# Patient Record
Sex: FEMALE | Race: WHITE | NOT HISPANIC OR LATINO | Employment: FULL TIME | URBAN - METROPOLITAN AREA
[De-identification: names, ages, dates, MRNs, and addresses within clinical notes are randomized per-mention and may not be internally consistent; named-entity substitution may affect disease eponyms.]

---

## 2017-10-03 ENCOUNTER — ALLSCRIPTS OFFICE VISIT (OUTPATIENT)
Dept: OTHER | Facility: OTHER | Age: 47
End: 2017-10-03

## 2017-10-03 ENCOUNTER — GENERIC CONVERSION - ENCOUNTER (OUTPATIENT)
Dept: OTHER | Facility: OTHER | Age: 47
End: 2017-10-03

## 2017-10-03 ENCOUNTER — LAB REQUISITION (OUTPATIENT)
Dept: LAB | Facility: HOSPITAL | Age: 47
End: 2017-10-03
Payer: COMMERCIAL

## 2017-10-03 DIAGNOSIS — Z12.39 ENCOUNTER FOR OTHER SCREENING FOR MALIGNANT NEOPLASM OF BREAST: ICD-10-CM

## 2017-10-03 DIAGNOSIS — Z01.419 ENCOUNTER FOR GYNECOLOGICAL EXAMINATION WITHOUT ABNORMAL FINDING: ICD-10-CM

## 2017-10-03 PROCEDURE — G0145 SCR C/V CYTO,THINLAYER,RESCR: HCPCS | Performed by: OBSTETRICS & GYNECOLOGY

## 2017-10-03 PROCEDURE — 87624 HPV HI-RISK TYP POOLED RSLT: CPT | Performed by: OBSTETRICS & GYNECOLOGY

## 2017-10-09 LAB — HPV RRNA GENITAL QL NAA+PROBE: ABNORMAL

## 2017-10-20 ENCOUNTER — GENERIC CONVERSION - ENCOUNTER (OUTPATIENT)
Dept: OTHER | Facility: OTHER | Age: 47
End: 2017-10-20

## 2017-10-20 LAB
LAB AP GYN PRIMARY INTERPRETATION: NORMAL
Lab: NORMAL
PATH INTERP SPEC-IMP: NORMAL

## 2017-11-02 ENCOUNTER — GENERIC CONVERSION - ENCOUNTER (OUTPATIENT)
Dept: OTHER | Facility: OTHER | Age: 47
End: 2017-11-02

## 2017-11-05 DIAGNOSIS — R92.8 OTHER ABNORMAL AND INCONCLUSIVE FINDINGS ON DIAGNOSTIC IMAGING OF BREAST: ICD-10-CM

## 2017-11-08 ENCOUNTER — ALLSCRIPTS OFFICE VISIT (OUTPATIENT)
Dept: OTHER | Facility: OTHER | Age: 47
End: 2017-11-08

## 2017-11-08 PROCEDURE — 88305 TISSUE EXAM BY PATHOLOGIST: CPT | Performed by: OBSTETRICS & GYNECOLOGY

## 2017-11-10 ENCOUNTER — LAB REQUISITION (OUTPATIENT)
Dept: LAB | Facility: HOSPITAL | Age: 47
End: 2017-11-10
Payer: COMMERCIAL

## 2017-11-10 DIAGNOSIS — R87.613 HIGH GRADE SQUAMOUS INTRAEPITHELIAL LESION ON CYTOLOGIC SMEAR OF CERVIX (HGSIL): ICD-10-CM

## 2017-11-10 NOTE — PROCEDURES
Assessment  1  Pap smear abnormality of cervix with HGSIL (795 04) (R87 613)   2  Pap smear of cervix shows high risk HPV present (795 05) (R87 810)    Plan  Abnormality of both breasts on screening mammography    · *US BREAST LEFT LIMITED (DIAGNOSTIC); Status:Active; Requested WEE:[id number];    Perform:[facility]; XXR:[id number]; Last Updated By:Karen Nelson; 11/6/2017 8:17:43 AM;Ordered; For:Abnormality of both breasts on screening mammography; Ordered By:Titi Almonte;   · *US BREAST RIGHT LIMITED (DIAGNOSTIC); Status:Active; Requested QCB:[id number];    Perform:[facility]; LUX:[id number]; Last Updated By:Karen Nelson; 11/6/2017 8:17:51 AM;Ordered; For:Abnormality of both breasts on screening mammography; Ordered By:Titi Almonte;  Encounter for other screening for malignant neoplasm of breast    · MAMMO SCREENING BILATERAL W 3D & CAD; Status:Canceled - Scheduling;    Perform:[facility]; [phone]; Last Updated By:Benito Izaguirre; 11/7/2017 2:08:43 PM;Ordered;for other screening for malignant neoplasm of breast; Ordered By:Titi Almonte;  Pap smear of cervix shows high risk HPV present    · Follow Up After Tests Complete Evaluation and Treatment  Follow-up  Status: Hold For -Scheduling  Requested for: [id number]   Ordered;Pap smear of cervix shows high risk HPV present; Ordered By: Maxx Gtz Performed:  Due: [id number]    Discussion/Summary  Discussion Summary:   HGIL, non 16/18 HR HPVCIN 2, 2 small polyps seen/removedpending  Chief Complaint  pt here for colposcopy   Active Problems  1  Abnormality of both breasts on screening mammography (793 80) (R92 8)   2  Encounter for other screening for malignant neoplasm of breast (V76 19) (Z12 39)   3  Hypothyroidism (244 9) (E03 9)   4  Visit for routine gyn exam (V72 31) (Z01 419)    Current Meds    1  Lo Loestrin Fe 1 MG-10 MCG / 10 MCG Oral Tablet; take 1 tablet daily by mouth;  Therapy: [id number] to (Doren De La Fuente) Requested for: 62Kwb1850; Last Rx:[id number] Ordered    2  Synthroid TABS; Therapy: (Recorded:95Ick0678) to Recorded    Allergies    1  No Known Drug Allergies    Procedure    Procedure: Indication: high grade squamous intraepithelial lesion-- and-- PCR positive for high risk HPV  Risks, benefits and alternatives were discussed with the patient  We discussed possible complications, including infection,-- bleeding-- and-- allergic reaction  Written consent was obtained prior to the procedure  The patient was premedicated with none  Procedure Note:  The squamocolumnar junction was fully visualized  Observation without staining showed no abnormalities, but-- no atypical vessels  After bathing the cervix in acetic acid, evaluation showed acetowhite changes at 7, 11 o'clock, but-- no punctation,-- no mosaicism-- and-- no atypical vessels  Vaginal Vault: no abnormalities seen  Vulva: no abnormalities seen  Cervical Biopsy: 2 biopsies taken of the cervix  -- the biopsies were taken at 7, 11 o'clock  Endocervical curettage was performed  Hemostasis was obtained with Monsel's solution  Patient Status: the patient tolerated the procedure well  Complications: there were no complications       SEE COLPO MAP         Signatures   Electronically signed by : LION Mireles ; Nov 8 2017  3:13PM EST                       (Author)

## 2017-11-17 ENCOUNTER — GENERIC CONVERSION - ENCOUNTER (OUTPATIENT)
Dept: OTHER | Facility: OTHER | Age: 47
End: 2017-11-17

## 2017-11-22 ENCOUNTER — GENERIC CONVERSION - ENCOUNTER (OUTPATIENT)
Dept: OTHER | Facility: OTHER | Age: 47
End: 2017-11-22

## 2017-11-29 ENCOUNTER — GENERIC CONVERSION - ENCOUNTER (OUTPATIENT)
Dept: OTHER | Facility: OTHER | Age: 47
End: 2017-11-29

## 2018-01-12 NOTE — RESULT NOTES
Verified Results  (1) THIN PREP PAP WITH IMAGING [id number] 09:47AM Pieter Saldaña   TW Order Number: [id number]     Test Name Result Flag Reference   LAB AP CASE REPORT (Report)     Gynecologic Cytology Report            Case: [id number]                  Authorizing Provider: Elyssa Orta MD     Collected:      10/03/2017 0947        First Screen:     Mariannlisa Gonzales, CT Received:      10/06/2017 7574        Pathologist:      Fredo Correa DO                               Specimen:  LIQUID-BASED PAP, SCREENING, Cervix   HPV HIGH RISK RESULT (Report)     HPV, High Risk: HPV POS, HPV16 NEG, HPV18 NEG      Other High Risk HPV Positive, HPV 16 Negative, HPV 18 Negative  Specimen is positive for the DNA of any one of, or combination of the following high risk HPV types: 88,29,61,77,80,46,93,40,64,02,37,93  HPV types 16 and 18 DNA were undetectable or below the pre-set threshold  [employer] FDA approved Bautista 4800 is utilized with strict adherence to the manufacturers instruction  manual to test for the presence of High-Risk HPV DNA, as well as HPV 16 and HPV 18  This instrument  has been validated by our laboratory and/or by the   A negative result does not preclude the presence of HPV infection because results depend on adequate  specimen collection, absence of inhibitors and sufficient DNA to be detected  Additionally, HPV negative  results are not intended to prevent women from proceeding to colposcopy if clinically warranted  Positive HPV test results indicate the presence of any one or more of the high risk types, but since patients  are often co-infected with low-risk types it does not rule out the presence of low-risk types in patients  with mixed infections     LAB AP GYN PRIMARY INTERPRETATION      Epithelial cell abnormality  Electronically signed by Fredo Correa DO on 10/20/2017 at 8:25 AM   LAB AP GYN INTERPRETATION      High grade squamous intraepithelial lesion   LAB AP GYN SPECIMEN ADEQUACY      Satisfactory for evaluation  Endocervical/transformation zone component present  LAB AP GYN ADDITIONAL INFORMATION (Report)     [employer] FDA approved ,  and ThinPrep Imaging System are   utilized with strict adherence to the 's instruction manual to   prepare gynecologic and non-gynecologic cytology specimens for the   production of ThinPrep slides as well as for gynecologic ThinPrep imaging  These processes have been validated by our laboratory and/or by the     The Pap test is not a diagnostic procedure and should not be used as the   sole means to detect cervical cancer  It is only a screening procedure to   aid in the detection of cervical cancer and its precursors  Both   false-negative and false-positive results have been experienced  Your   patient's test result should be interpreted in this context together with   the history and clinical findings  LAB AP CASE REPORT (Report)     Gynecologic Cytology Report            Case: [id number]                  Authorizing Provider: Ena Weems MD     Collected:      10/03/2017 0947        First Screen:     ROMARIO Fox Received:      10/06/2017 1702        Pathologist:      Mike Garcia DO                               Specimen:  LIQUID-BASED PAP, SCREENING, Cervix   HPV HIGH RISK RESULT (Report)     HPV, High Risk: HPV POS, HPV16 NEG, HPV18 NEG      Other High Risk HPV Positive, HPV 16 Negative, HPV 18 Negative  Specimen is positive for the DNA of any one of, or combination of the following high risk HPV types: 89,12,55,08,25,87,47,08,21,74,84,64  HPV types 16 and 18 DNA were undetectable or below the pre-set threshold  [employer] FDA approved Bautista 4800 is utilized with strict adherence to the manufacturers instruction  manual to test for the presence of High-Risk HPV DNA, as well as HPV 16 and HPV 18   This instrument  has been validated by our laboratory and/or by the   A negative result does not preclude the presence of HPV infection because results depend on adequate  specimen collection, absence of inhibitors and sufficient DNA to be detected  Additionally, HPV negative  results are not intended to prevent women from proceeding to colposcopy if clinically warranted  Positive HPV test results indicate the presence of any one or more of the high risk types, but since patients  are often co-infected with low-risk types it does not rule out the presence of low-risk types in patients  with mixed infections  LAB AP GYN PRIMARY INTERPRETATION      Epithelial cell abnormality  Electronically signed by Meena Zamora DO on 10/20/2017 at 8:25 AM   LAB AP GYN INTERPRETATION      High grade squamous intraepithelial lesion   LAB AP GYN SPECIMEN ADEQUACY      Satisfactory for evaluation  Endocervical/transformation zone component present  LAB AP GYN ADDITIONAL INFORMATION (Report)     [employer] FDA approved ,  and ThinPrep Imaging System are   utilized with strict adherence to the 's instruction manual to   prepare gynecologic and non-gynecologic cytology specimens for the   production of ThinPrep slides as well as for gynecologic ThinPrep imaging  These processes have been validated by our laboratory and/or by the     The Pap test is not a diagnostic procedure and should not be used as the   sole means to detect cervical cancer  It is only a screening procedure to   aid in the detection of cervical cancer and its precursors  Both   false-negative and false-positive results have been experienced  Your   patient's test result should be interpreted in this context together with   the history and clinical findings

## 2018-01-13 NOTE — RESULT NOTES
Verified Results  (1) TISSUE EXAM [id number] 10:07AM Tawana Argueta     Test Name Result Flag Reference   LAB AP CASE REPORT (Report)     Surgical Pathology Report             Case: [id number]                   Authorizing Provider: Sheryle Bernard, MD     Collected:      11/08/2017           Pathologist:      Erik Armstrong MD   Received:      11/10/2017 1143        Specimens:  A) - Polyp, Cervical/Endometrial, Cervical polyp                            B) - Cervix, CAL 1          C) - Cervix, CAL 2          D) - Endocervical   LAB AP FINAL DIAGNOSIS (Report)     A  Cervical polyp:    - Features consistent with benign endocervical polyp     - Negative for dysplasia and malignancy  B  Cervix, 8:00, biopsy:    - Low-grade squamous intraepithelial lesion (LGSIL)/mild squamous   dysplasia (CAL 1) with HPV cytopathic      effect     - Negative for invasive carcinoma  C  Cervix, 10:00, biopsy:    - Low-grade squamous intraepithelial lesion (LGSIL)/mild squamous   dysplasia (CAL 1) with HPV cytopathic      effect     - Negative for invasive carcinoma  D  Endocervical curettings:    - Benign endocervical glandular epithelium, negative for dysplasia and   malignancy  - Scant benign endometrial tissue  Electronically signed by Erik Armstrong MD on 11/15/2017 at 10:07 AM   LAB AP NOTE      Interpretation performed at [facility], [address] [address]   LAB AP SURGICAL ADDITIONAL INFORMATION (Report)     All controls performed with the immunohistochemical stains reported above   reacted appropriately  These tests were developed and their performance   characteristics determined by [employer] or   [employer]  They may not be cleared or approved by the [place]  Food and Drug Administration  The FDA has determined that such clearance   or approval is not necessary  These tests are used for clinical purposes     They should not be regarded as investigational or for research  This   laboratory has been approved by [place], designated as a high-complexity   laboratory and is qualified to perform these tests  LAB AP GROSS DESCRIPTION (Report)     A  The specimen is received in formalin, labeled with the patient's name   and hospital number, and is designated Cervical polyp  The specimen   consists of multiple tan brown soft tissue, hemorrhagic and mucoid   fragments measuring in aggregate 0 7 x 0 6 x 0 1 cm  Entirely submitted  One cassette  B  The specimen is received in formalin, labeled with the patient's name   and hospital number, and is designated Cervical biopsy at 8:00  The   specimen consists of a tan soft tissue fragment measuring 0 6 cm  Entirely   submitted  One cassette  C  The specimen is received in formalin, labeled with the patient's name   and hospital number, and is designated Cervical biopsy at 10:00  The   specimen consists of multiple tan to brown to colorless mucinous,   hemorrhagic and soft tissue fragments measuring in aggregate 0 7 x 0 6 x   0 1 cm  Entirely submitted  One cassette  D  The specimen is received in formalin, labeled with the patient's name   and hospital number, and is designated Endocervical curetting  The   specimen consists of multiple brown to colorless mucinous and hemorrhagic   fragments measuring in aggregate 2 x 1 2 x 0 1 cm  Entirely submitted  One   cassette  Note: The estimated total formalin fixation time based upon information   provided by the submitting clinician and the standard processing schedule   is less than 72 hours      [facility]

## 2018-01-14 VITALS
OXYGEN SATURATION: 99 % | WEIGHT: 155 LBS | SYSTOLIC BLOOD PRESSURE: 126 MMHG | BODY MASS INDEX: 26.46 KG/M2 | HEART RATE: 73 BPM | HEIGHT: 64 IN | DIASTOLIC BLOOD PRESSURE: 78 MMHG

## 2018-01-22 VITALS
WEIGHT: 156.13 LBS | DIASTOLIC BLOOD PRESSURE: 62 MMHG | HEART RATE: 76 BPM | OXYGEN SATURATION: 99 % | HEIGHT: 64 IN | BODY MASS INDEX: 26.66 KG/M2 | SYSTOLIC BLOOD PRESSURE: 112 MMHG

## 2018-01-22 VITALS
OXYGEN SATURATION: 99 % | WEIGHT: 154.13 LBS | SYSTOLIC BLOOD PRESSURE: 124 MMHG | BODY MASS INDEX: 26.31 KG/M2 | HEART RATE: 75 BPM | HEIGHT: 64 IN | DIASTOLIC BLOOD PRESSURE: 76 MMHG

## 2018-10-31 PROBLEM — Z12.4 SCREENING FOR CERVICAL CANCER: Status: ACTIVE | Noted: 2018-10-31

## 2018-10-31 PROBLEM — Z12.31 ENCOUNTER FOR SCREENING MAMMOGRAM FOR BREAST CANCER: Status: ACTIVE | Noted: 2018-10-31

## 2018-10-31 PROBLEM — Z01.419 ENCOUNTER FOR ANNUAL ROUTINE GYNECOLOGICAL EXAMINATION: Status: ACTIVE | Noted: 2018-10-31

## 2018-10-31 PROBLEM — B97.7 HPV IN FEMALE: Status: ACTIVE | Noted: 2018-10-31
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
## 2018-11-01 ENCOUNTER — ANNUAL EXAM (OUTPATIENT)
Dept: GYNECOLOGY | Facility: CLINIC | Age: 48
End: 2018-11-01
Payer: COMMERCIAL

## 2018-11-01 VITALS
BODY MASS INDEX: 27.55 KG/M2 | HEIGHT: 64 IN | DIASTOLIC BLOOD PRESSURE: 66 MMHG | WEIGHT: 161.4 LBS | SYSTOLIC BLOOD PRESSURE: 110 MMHG

## 2018-11-01 DIAGNOSIS — Z87.410 HISTORY OF CERVICAL DYSPLASIA: ICD-10-CM

## 2018-11-01 DIAGNOSIS — Z12.4 SCREENING FOR CERVICAL CANCER: ICD-10-CM

## 2018-11-01 DIAGNOSIS — Z01.419 ENCOUNTER FOR ANNUAL ROUTINE GYNECOLOGICAL EXAMINATION: Primary | ICD-10-CM

## 2018-11-01 DIAGNOSIS — B97.7 HPV IN FEMALE: ICD-10-CM

## 2018-11-01 DIAGNOSIS — Z12.31 ENCOUNTER FOR SCREENING MAMMOGRAM FOR BREAST CANCER: ICD-10-CM

## 2018-11-01 PROCEDURE — S0612 ANNUAL GYNECOLOGICAL EXAMINA: HCPCS | Performed by: OBSTETRICS & GYNECOLOGY

## 2018-11-01 PROCEDURE — G0124 SCREEN C/V THIN LAYER BY MD: HCPCS | Performed by: PATHOLOGY

## 2018-11-01 PROCEDURE — 87624 HPV HI-RISK TYP POOLED RSLT: CPT | Performed by: OBSTETRICS & GYNECOLOGY

## 2018-11-01 PROCEDURE — G0145 SCR C/V CYTO,THINLAYER,RESCR: HCPCS | Performed by: PATHOLOGY

## 2018-11-01 RX ORDER — LEVOTHYROXINE SODIUM 0.05 MG/1
50 TABLET ORAL DAILY
COMMUNITY

## 2018-11-06 LAB
HPV HR 12 DNA CVX QL NAA+PROBE: POSITIVE
HPV16 DNA CVX QL NAA+PROBE: NEGATIVE
HPV18 DNA CVX QL NAA+PROBE: NEGATIVE

## 2018-11-08 LAB
LAB AP GYN PRIMARY INTERPRETATION: NORMAL
Lab: NORMAL
PATH INTERP SPEC-IMP: NORMAL

## 2018-11-09 NOTE — PROGRESS NOTES
Persistently abnormal Pap smear  Colposcopy performed last year and CAL 1 was present  This has not resolved  Colposcopy is indicated again for a possible progression of dysplasia    HPV high risk non 16/18 is positive

## 2018-11-25 PROBLEM — R87.613 PAP SMEAR ABNORMALITY OF CERVIX WITH HGSIL: Status: ACTIVE | Noted: 2018-11-25

## 2018-12-05 RX ORDER — FLUTICASONE PROPIONATE 50 MCG
SPRAY, SUSPENSION (ML) NASAL
COMMUNITY

## 2018-12-05 RX ORDER — LORATADINE 10 MG/1
TABLET ORAL DAILY
COMMUNITY

## 2018-12-06 PROBLEM — R87.618 PAP SMEAR ABNORMALITY OF CERVIX/HUMAN PAPILLOMAVIRUS (HPV) POSITIVE: Status: ACTIVE | Noted: 2018-12-06

## 2018-12-06 NOTE — PROGRESS NOTES
Assessment/Plan:   Colposcopy for persistent abnormal Pap smear suggesting HGIL  CAL 1 found on colposcopy '17  Persistent HPV non 16/18  Lesions similar to last year  Diagnoses and all orders for this visit:    Pap smear abnormality of cervix with HGSIL    History of cervical dysplasia    Pap smear abnormality of cervix/human papillomavirus (HPV) positive    Other orders  -     loratadine (CLARITIN) 10 mg tablet; Daily  -     fluticasone (FLONASE) 50 mcg/act nasal spray; fluticasone 50 mcg/actuation nasal spray,suspension  -     Norethin-Eth Estrad-Fe Biphas (LO LOESTRIN FE PO); Lo Loestrin Fe 1 mg-10 mcg (24)/10 mcg (2) tablet  -     Tissue Exam; Future              Subjective:        Patient ID: Brinda Brooks is a 46 y o  female  Rosio Scarce is here for a repeat colposcopy for persistently abnormal Pap smear  Last year on colposcopy she had CAL 1 which was observed        The following portions of the patient's history were reviewed and updated as appropriate: She  has no past medical history on file  Patient Active Problem List    Diagnosis Date Noted    Pap smear abnormality of cervix/human papillomavirus (HPV) positive 12/06/2018    Pap smear abnormality of cervix with HGSIL 11/25/2018    History of cervical dysplasia 11/01/2018    Encounter for annual routine gynecological examination 10/31/2018    Screening for cervical cancer 10/31/2018    Encounter for screening mammogram for breast cancer 10/31/2018    HPV in female 10/31/2018     She  has a past surgical history that includes Knee surgery and Breast biopsy (Right)  Her family history includes Arthritis in her mother; Diabetes in her father  She  reports that she has never smoked  She has never used smokeless tobacco  She reports that she drinks alcohol  She reports that she does not use drugs    Current Outpatient Prescriptions   Medication Sig Dispense Refill    fluticasone (FLONASE) 50 mcg/act nasal spray fluticasone 50 mcg/actuation nasal spray,suspension      levothyroxine (SYNTHROID) 50 mcg tablet Take 50 mcg by mouth daily      loratadine (CLARITIN) 10 mg tablet Daily      Norethin-Eth Estrad-Fe Biphas (LO LOESTRIN FE PO) Lo Loestrin Fe 1 mg-10 mcg (24)/10 mcg (2) tablet       No current facility-administered medications for this visit  Current Outpatient Prescriptions on File Prior to Visit   Medication Sig    levothyroxine (SYNTHROID) 50 mcg tablet Take 50 mcg by mouth daily     No current facility-administered medications on file prior to visit  She has No Known Allergies       Review of Systems      Objective: There were no vitals filed for this visit  Physical Exam    Colposcopy  Date/Time: 12/7/2018 9:35 AM  Performed by: Arliss Klinefelter  Authorized by: Arliss Klinefelter     Consent:     Consent obtained:  Verbal and written    Consent given by:  Patient    Procedural risks discussed:  Bleeding, failure rate and infection    Patient questions answered: yes      Patient agrees, verbalizes understanding, and wants to proceed: yes      Instructions and paperwork completed: yes    Pre-procedure:     Pre-procedure timeout performed: yes    Indication:     Indication:  HSIL  Procedure:     Procedure: Colposcopy w/ cervical biopsy and ECC      Under satisfactory analgesia the patient was prepped and draped in the dorsal lithotomy position: yes      [place] speculum was placed in the vagina: yes      Under colposcopic examination the transition zone was seen in entirety: yes      Endocervix was curetted using a Kevorkian curette: yes      Cervical biopsy performed with a cervical biopsy punch: yes      Monsel's solution was applied: yes      Biopsy(s): yes      Location:  8, 10,11    Specimen to pathology: yes    Post-procedure:     Findings: White epithelium      Impression: High grade cervical dysplasia    Comments:      Abnormal aceto-white changes similar last year- 7-8 o'clock, 10-11 o'clock    The aceto-white changes at 10:11 o'clock have the appearance of moderate dysplasia  The endocervical canal was clean

## 2018-12-07 ENCOUNTER — PROCEDURE VISIT (OUTPATIENT)
Dept: GYNECOLOGY | Facility: CLINIC | Age: 48
End: 2018-12-07
Payer: COMMERCIAL

## 2018-12-07 VITALS
HEIGHT: 64 IN | DIASTOLIC BLOOD PRESSURE: 72 MMHG | BODY MASS INDEX: 27.9 KG/M2 | SYSTOLIC BLOOD PRESSURE: 122 MMHG | WEIGHT: 163.4 LBS

## 2018-12-07 DIAGNOSIS — R87.613 PAP SMEAR ABNORMALITY OF CERVIX WITH HGSIL: Primary | ICD-10-CM

## 2018-12-07 DIAGNOSIS — Z87.410 HISTORY OF CERVICAL DYSPLASIA: ICD-10-CM

## 2018-12-07 DIAGNOSIS — R87.618 PAP SMEAR ABNORMALITY OF CERVIX/HUMAN PAPILLOMAVIRUS (HPV) POSITIVE: ICD-10-CM

## 2018-12-07 PROCEDURE — 57454 BX/CURETT OF CERVIX W/SCOPE: CPT | Performed by: OBSTETRICS & GYNECOLOGY

## 2018-12-07 PROCEDURE — 88305 TISSUE EXAM BY PATHOLOGIST: CPT | Performed by: PATHOLOGY

## 2018-12-07 PROCEDURE — 88344 IMHCHEM/IMCYTCHM EA MLT ANTB: CPT | Performed by: PATHOLOGY

## 2018-12-13 NOTE — PROGRESS NOTES
CAL 1 persists but has not gotten any worse  Because this might be an ongoing problem I am recommending that you consider cryo surgery which has a 90% chance of resolving this issue  It is done in the office and takes 10 min to do  I would recommend 2 Advil 1 hr beforehand because of possible cramping  The abnormal tissue falls off in the form of a watery discharge over a 2 week period of time  It is very safe  Very small risk of infection and failure

## 2019-01-02 PROBLEM — N87.0 MILD CERVICAL DYSPLASIA: Status: ACTIVE | Noted: 2019-01-02

## 2019-01-02 NOTE — PROGRESS NOTES
Procedures     Cryosurgery-  Double freeze using nitrous oxide-  The tank was checked for adequate pressure  After informed consent and an explanation of the process a medium size Graves speculum was inserted into the vagina  An appropriately sized probe tip was then coated with K-Y jelly and applied to the center of the cervix  The cervix was then frozen for 2 minutes, thawed for 4 minutes, and refrozen for 2 minutes  The procedure was well tolerated  Instructions had been given earlier  Shell return to the office in 4 weeks  Pelvic rest until then

## 2019-01-03 ENCOUNTER — PROCEDURE VISIT (OUTPATIENT)
Dept: GYNECOLOGY | Facility: CLINIC | Age: 49
End: 2019-01-03
Payer: COMMERCIAL

## 2019-01-03 VITALS
SYSTOLIC BLOOD PRESSURE: 120 MMHG | WEIGHT: 164.8 LBS | BODY MASS INDEX: 28.13 KG/M2 | DIASTOLIC BLOOD PRESSURE: 72 MMHG | HEART RATE: 82 BPM | HEIGHT: 64 IN

## 2019-01-03 DIAGNOSIS — B97.7 HPV IN FEMALE: ICD-10-CM

## 2019-01-03 DIAGNOSIS — Z87.410 HISTORY OF CERVICAL DYSPLASIA: ICD-10-CM

## 2019-01-03 DIAGNOSIS — N87.0 MILD CERVICAL DYSPLASIA: Primary | ICD-10-CM

## 2019-01-03 PROCEDURE — 57511 CRYOCAUTERY OF CERVIX: CPT | Performed by: OBSTETRICS & GYNECOLOGY

## 2019-02-04 ENCOUNTER — OFFICE VISIT (OUTPATIENT)
Dept: GYNECOLOGY | Facility: CLINIC | Age: 49
End: 2019-02-04

## 2019-02-04 VITALS
HEIGHT: 64 IN | DIASTOLIC BLOOD PRESSURE: 60 MMHG | BODY MASS INDEX: 27.76 KG/M2 | SYSTOLIC BLOOD PRESSURE: 102 MMHG | WEIGHT: 162.6 LBS

## 2019-02-04 DIAGNOSIS — Z48.89 POSTOPERATIVE VISIT: ICD-10-CM

## 2019-02-04 DIAGNOSIS — N87.0 MILD CERVICAL DYSPLASIA: Primary | ICD-10-CM

## 2019-02-04 DIAGNOSIS — B97.7 HPV IN FEMALE: ICD-10-CM

## 2019-02-04 PROCEDURE — 99024 POSTOP FOLLOW-UP VISIT: CPT | Performed by: OBSTETRICS & GYNECOLOGY

## 2019-02-04 NOTE — PROGRESS NOTES
Assessment/Plan:         Diagnoses and all orders for this visit:    Mild cervical dysplasia    HPV in female              Subjective:        Patient ID: Nani Marcum is a 46 y o  female  Eze Lights is 4 weeks past having  Cryosurgery  She is without complaints  LMP was 2 weeks ago without any problems  The following portions of the patient's history were reviewed and updated as appropriate: She  has no past medical history on file  Patient Active Problem List    Diagnosis Date Noted    Mild cervical dysplasia 01/02/2019    Pap smear abnormality of cervix/human papillomavirus (HPV) positive 12/06/2018    Pap smear abnormality of cervix with HGSIL 11/25/2018    History of cervical dysplasia 11/01/2018    Encounter for annual routine gynecological examination 10/31/2018    Screening for cervical cancer 10/31/2018    Encounter for screening mammogram for breast cancer 10/31/2018    HPV in female 10/31/2018     She  has a past surgical history that includes Knee surgery and Breast biopsy (Right)  Her family history includes Arthritis in her mother; Diabetes in her father  She  reports that she has never smoked  She has never used smokeless tobacco  She reports that she drinks alcohol  She reports that she does not use drugs  Current Outpatient Prescriptions   Medication Sig Dispense Refill    fluticasone (FLONASE) 50 mcg/act nasal spray fluticasone 50 mcg/actuation nasal spray,suspension      levothyroxine (SYNTHROID) 50 mcg tablet Take 50 mcg by mouth daily      loratadine (CLARITIN) 10 mg tablet Daily       No current facility-administered medications for this visit        Current Outpatient Prescriptions on File Prior to Visit   Medication Sig    fluticasone (FLONASE) 50 mcg/act nasal spray fluticasone 50 mcg/actuation nasal spray,suspension    levothyroxine (SYNTHROID) 50 mcg tablet Take 50 mcg by mouth daily    loratadine (CLARITIN) 10 mg tablet Daily     No current facility-administered medications on file prior to visit  She has No Known Allergies       Review of Systems   Constitutional: Negative  Objective: There were no vitals filed for this visit  Physical Exam   Constitutional: She appears well-developed and well-nourished  No distress  Genitourinary: Vagina normal  Cervix exhibits discharge  Cervix exhibits no friability  Genitourinary Comments: Physiologic discharge from healing of the cervix  No evidence of infection
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
## 2019-11-15 ENCOUNTER — ANNUAL EXAM (OUTPATIENT)
Dept: GYNECOLOGY | Facility: CLINIC | Age: 49
End: 2019-11-15
Payer: COMMERCIAL

## 2019-11-15 VITALS
WEIGHT: 164.8 LBS | DIASTOLIC BLOOD PRESSURE: 78 MMHG | BODY MASS INDEX: 28.13 KG/M2 | SYSTOLIC BLOOD PRESSURE: 122 MMHG | HEIGHT: 64 IN

## 2019-11-15 DIAGNOSIS — B97.7 HPV IN FEMALE: ICD-10-CM

## 2019-11-15 DIAGNOSIS — N87.0 MILD CERVICAL DYSPLASIA: ICD-10-CM

## 2019-11-15 DIAGNOSIS — Z01.419 ENCOUNTER FOR ANNUAL ROUTINE GYNECOLOGICAL EXAMINATION: Primary | ICD-10-CM

## 2019-11-15 DIAGNOSIS — Z12.4 SCREENING FOR CERVICAL CANCER: ICD-10-CM

## 2019-11-15 DIAGNOSIS — Z12.31 ENCOUNTER FOR SCREENING MAMMOGRAM FOR BREAST CANCER: ICD-10-CM

## 2019-11-15 PROCEDURE — 99396 PREV VISIT EST AGE 40-64: CPT | Performed by: OBSTETRICS & GYNECOLOGY

## 2019-11-15 PROCEDURE — G0145 SCR C/V CYTO,THINLAYER,RESCR: HCPCS | Performed by: OBSTETRICS & GYNECOLOGY

## 2019-11-15 RX ORDER — CEFDINIR 300 MG/1
CAPSULE ORAL
COMMUNITY
End: 2020-11-16

## 2019-11-21 LAB
LAB AP GYN PRIMARY INTERPRETATION: NORMAL
Lab: NORMAL

## 2020-01-15 ENCOUNTER — HOSPITAL ENCOUNTER (OUTPATIENT)
Dept: MAMMOGRAPHY | Facility: MEDICAL CENTER | Age: 50
Discharge: HOME | End: 2020-01-15
Attending: OBSTETRICS & GYNECOLOGY
Payer: COMMERCIAL

## 2020-01-15 VITALS — HEIGHT: 64 IN | WEIGHT: 164 LBS | BODY MASS INDEX: 28 KG/M2

## 2020-01-15 DIAGNOSIS — Z12.31 ENCOUNTER FOR SCREENING MAMMOGRAM FOR BREAST CANCER: ICD-10-CM

## 2020-01-15 PROCEDURE — 77063 BREAST TOMOSYNTHESIS BI: CPT

## 2020-01-15 PROCEDURE — 77067 SCR MAMMO BI INCL CAD: CPT

## 2020-01-21 DIAGNOSIS — R92.8 ABNORMAL MAMMOGRAM OF BOTH BREASTS: Primary | ICD-10-CM

## 2020-01-24 ENCOUNTER — HOSPITAL ENCOUNTER (OUTPATIENT)
Dept: MAMMOGRAPHY | Facility: CLINIC | Age: 50
Discharge: HOME | End: 2020-01-24
Payer: COMMERCIAL

## 2020-01-24 ENCOUNTER — HOSPITAL ENCOUNTER (OUTPATIENT)
Dept: ULTRASOUND IMAGING | Facility: CLINIC | Age: 50
Discharge: HOME | End: 2020-01-24
Payer: COMMERCIAL

## 2020-01-24 DIAGNOSIS — R92.8 ABNORMAL MAMMOGRAM OF BOTH BREASTS: ICD-10-CM

## 2020-01-24 PROCEDURE — 77066 DX MAMMO INCL CAD BI: CPT

## 2020-01-24 PROCEDURE — G0279 TOMOSYNTHESIS, MAMMO: HCPCS

## 2020-01-24 PROCEDURE — 76642 ULTRASOUND BREAST LIMITED: CPT

## 2020-01-24 NOTE — RESULT ENCOUNTER NOTE
Good news the questionable mammogram findings are felt to be benign tissue  Fibrocystic changes on the right and numerous cyst in the left breast   Routine annual screening is recommended

## 2020-11-16 ENCOUNTER — ANNUAL EXAM (OUTPATIENT)
Dept: GYNECOLOGY | Facility: CLINIC | Age: 50
End: 2020-11-16
Payer: COMMERCIAL

## 2020-11-16 VITALS
HEIGHT: 64 IN | BODY MASS INDEX: 27.9 KG/M2 | DIASTOLIC BLOOD PRESSURE: 70 MMHG | TEMPERATURE: 98.2 F | SYSTOLIC BLOOD PRESSURE: 120 MMHG | WEIGHT: 163.4 LBS

## 2020-11-16 DIAGNOSIS — Z01.419 ENCOUNTER FOR ANNUAL ROUTINE GYNECOLOGICAL EXAMINATION: Primary | ICD-10-CM

## 2020-11-16 DIAGNOSIS — Z12.31 ENCOUNTER FOR SCREENING MAMMOGRAM FOR BREAST CANCER: ICD-10-CM

## 2020-11-16 DIAGNOSIS — N87.0 MILD CERVICAL DYSPLASIA: ICD-10-CM

## 2020-11-16 PROCEDURE — 99396 PREV VISIT EST AGE 40-64: CPT | Performed by: OBSTETRICS & GYNECOLOGY

## 2021-02-26 ENCOUNTER — HOSPITAL ENCOUNTER (OUTPATIENT)
Dept: MAMMOGRAPHY | Facility: MEDICAL CENTER | Age: 51
Discharge: HOME | End: 2021-02-26
Payer: COMMERCIAL

## 2021-02-26 ENCOUNTER — TELEPHONE (OUTPATIENT)
Dept: OBGYN CLINIC | Facility: CLINIC | Age: 51
End: 2021-02-26

## 2021-02-26 VITALS — BODY MASS INDEX: 27.83 KG/M2 | HEIGHT: 64 IN | WEIGHT: 163 LBS

## 2021-02-26 DIAGNOSIS — Z12.31 ENCOUNTER FOR SCREENING MAMMOGRAM FOR BREAST CANCER: ICD-10-CM

## 2021-02-26 PROCEDURE — 77067 SCR MAMMO BI INCL CAD: CPT

## 2021-02-26 PROCEDURE — 77063 BREAST TOMOSYNTHESIS BI: CPT

## 2021-02-26 NOTE — TELEPHONE ENCOUNTER
Spoke to pt and let her know mammo was neg    ----- Message from Deneen Scott MD sent at 2/26/2021  2:16 PM EST -----  Results are normal  Please notify patient

## 2021-11-26 ENCOUNTER — NURSE TRIAGE (OUTPATIENT)
Dept: OTHER | Facility: OTHER | Age: 51
End: 2021-11-26

## 2021-11-26 DIAGNOSIS — Z20.822 ENCOUNTER FOR SCREENING LABORATORY TESTING FOR COVID-19 VIRUS: Primary | ICD-10-CM

## 2021-11-30 PROCEDURE — U0003 INFECTIOUS AGENT DETECTION BY NUCLEIC ACID (DNA OR RNA); SEVERE ACUTE RESPIRATORY SYNDROME CORONAVIRUS 2 (SARS-COV-2) (CORONAVIRUS DISEASE [COVID-19]), AMPLIFIED PROBE TECHNIQUE, MAKING USE OF HIGH THROUGHPUT TECHNOLOGIES AS DESCRIBED BY CMS-2020-01-R: HCPCS | Performed by: FAMILY MEDICINE

## 2021-11-30 PROCEDURE — U0005 INFEC AGEN DETEC AMPLI PROBE: HCPCS | Performed by: FAMILY MEDICINE

## 2022-07-13 ENCOUNTER — HOSPITAL ENCOUNTER (EMERGENCY)
Facility: HOSPITAL | Age: 52
Discharge: HOME | End: 2022-07-13
Admitting: EMERGENCY MEDICINE
Payer: COMMERCIAL

## 2022-07-13 VITALS
DIASTOLIC BLOOD PRESSURE: 88 MMHG | RESPIRATION RATE: 16 BRPM | TEMPERATURE: 98.6 F | SYSTOLIC BLOOD PRESSURE: 166 MMHG | OXYGEN SATURATION: 95 % | WEIGHT: 170 LBS | HEART RATE: 81 BPM | BODY MASS INDEX: 29.18 KG/M2

## 2022-07-13 DIAGNOSIS — S61.211A LACERATION OF LEFT INDEX FINGER WITHOUT FOREIGN BODY WITHOUT DAMAGE TO NAIL, INITIAL ENCOUNTER: Primary | ICD-10-CM

## 2022-07-13 PROCEDURE — 90471 IMMUNIZATION ADMIN: CPT

## 2022-07-13 PROCEDURE — 90715 TDAP VACCINE 7 YRS/> IM: CPT | Performed by: EMERGENCY MEDICINE

## 2022-07-13 PROCEDURE — 99282 EMERGENCY DEPT VISIT SF MDM: CPT

## 2022-07-13 PROCEDURE — 12042 INTMD RPR N-HF/GENIT2.6-7.5: CPT | Performed by: EMERGENCY MEDICINE

## 2022-07-13 PROCEDURE — 99282 EMERGENCY DEPT VISIT SF MDM: CPT | Performed by: EMERGENCY MEDICINE

## 2022-07-13 RX ORDER — LIDOCAINE HYDROCHLORIDE 10 MG/ML
INJECTION, SOLUTION EPIDURAL; INFILTRATION; INTRACAUDAL; PERINEURAL
Status: DISCONTINUED
Start: 2022-07-13 | End: 2022-07-13 | Stop reason: HOSPADM

## 2022-07-13 RX ORDER — LIDOCAINE HYDROCHLORIDE 10 MG/ML
5 INJECTION, SOLUTION EPIDURAL; INFILTRATION; INTRACAUDAL; PERINEURAL ONCE
Status: COMPLETED | OUTPATIENT
Start: 2022-07-13 | End: 2022-07-13

## 2022-07-13 RX ADMIN — LIDOCAINE HYDROCHLORIDE 5 ML: 10 INJECTION, SOLUTION EPIDURAL; INFILTRATION; INTRACAUDAL at 13:42

## 2022-07-13 RX ADMIN — TETANUS TOXOID, REDUCED DIPHTHERIA TOXOID AND ACELLULAR PERTUSSIS VACCINE, ADSORBED 0.5 ML: 5; 2.5; 8; 8; 2.5 SUSPENSION INTRAMUSCULAR at 15:28

## 2022-07-13 NOTE — DISCHARGE INSTRUCTIONS
Please return to the emergency department if you notice any new numbness once the anesthetic has warn off, pain that does not improve with analgesics such as tylenol or motrin, new rash of the area, inability to move your fingers, or any other concerns

## 2022-07-13 NOTE — ED PROVIDER NOTES
History  Chief Complaint   Patient presents with    Finger Laceration     Pts left forefinger was lacerated by a hedge clippers  Pt states that there was blood "splirting"  L and is a 68-year-old female presented to the emergency department status post finger laceration left 2nd digit while cutting her hedge with an electric   Patient notes that while she was cutting the head she felt a lb and when she looked her hand saw that it was bleeding  She immediately wrapped the finger and came to the ED   patient notes pain roughly 7/10, sharp in nature nonradiating  Patient denies numbness, but notes mild tingling at the distal phalange  Patient states she is able to move her finger without issue, and notes that there is a lot of bleeding when the bandage is removed  Patient offers no other complaints at this time  History provided by:  Patient   used: No    Finger Laceration  Location:  Finger  Finger laceration location:  L index finger  Depth: Through underlying tissue  Bleeding comment:  Arteriolar  Time since incident:  1 hour  Injury mechanism:   Pain details:     Quality:  Sharp    Severity:  Moderate    Timing:  Constant    Progression:  Unchanged  Foreign body present:  No foreign bodies  Relieved by:  Pressure  Worsened by: Movement  Ineffective treatments:  None tried  Tetanus status:  Unknown  Associated symptoms: no fever and no rash        Prior to Admission Medications   Prescriptions Last Dose Informant Patient Reported? Taking?   fluticasone (FLONASE) 50 mcg/act nasal spray   Yes No   Sig: fluticasone 50 mcg/actuation nasal spray,suspension   levothyroxine (SYNTHROID) 50 mcg tablet  Self Yes No   Sig: Take 50 mcg by mouth daily   loratadine (CLARITIN) 10 mg tablet   Yes No   Sig: Daily      Facility-Administered Medications: None       History reviewed  No pertinent past medical history      Past Surgical History:   Procedure Laterality Date  BREAST BIOPSY Right     Benign    KNEE SURGERY         Family History   Problem Relation Age of Onset    Diabetes Father    [facility] Arthritis Mother     No Known Problems Sister     No Known Problems Daughter     No Known Problems Maternal Grandmother     No Known Problems Maternal Grandfather     No Known Problems Paternal Grandmother     Cancer Paternal Grandfather     No Known Problems Sister     No Known Problems Sister     No Known Problems Sister     No Known Problems Maternal Aunt     No Known Problems Maternal Aunt     No Known Problems Paternal Aunt      I have reviewed and agree with the history as documented  E-Cigarette/Vaping    E-Cigarette Use Never User      E-Cigarette/Vaping Substances    Nicotine No     THC No     CBD No     Flavoring No     Other No     Unknown No      Social History     Tobacco Use    Smoking status: Never Smoker    Smokeless tobacco: Never Used   Vaping Use    Vaping Use: Never used   Substance Use Topics    Alcohol use: Yes    Drug use: No        Review of Systems   Constitutional: Negative for chills and fever  HENT: Negative for ear pain and sore throat  Eyes: Negative for pain and visual disturbance  Respiratory: Negative for cough and shortness of breath  Cardiovascular: Negative for chest pain and palpitations  Gastrointestinal: Negative for abdominal pain and vomiting  Genitourinary: Negative for dysuria and hematuria  Musculoskeletal: Negative for arthralgias and back pain  Skin: Positive for wound (Left 2nd digit laceration)  Negative for color change and rash  Neurological: Negative for seizures and syncope  All other systems reviewed and are negative        Physical Exam  ED Triage Vitals [07/13/22 1317]   Temperature Pulse Respirations Blood Pressure SpO2   98 6 °F (37 °C) 81 16 (!) 160/109 95 %      Temp Source Heart Rate Source Patient Position - Orthostatic VS BP Location FiO2 (%)   Oral -- Lying Right arm -- Pain Score       1             Orthostatic Vital Signs  Vitals:    07/13/22 1317 07/13/22 1320   BP: (!) 160/109 166/88   Pulse: 81    Patient Position - Orthostatic VS: Lying        Physical Exam  Vitals and nursing note reviewed  Constitutional:       General: She is not in acute distress  Appearance: She is well-developed  HENT:      Head: Normocephalic and atraumatic  Eyes:      Conjunctiva/sclera: Conjunctivae normal    Cardiovascular:      Pulses: Normal pulses  Pulmonary:      Effort: Pulmonary effort is normal  No respiratory distress  Breath sounds: Normal breath sounds  Musculoskeletal:         General: Swelling ( left 2nd digit), tenderness (Left 2nd digit) and signs of injury ( left 2nd digit twin MCP and PIP joint there is a 3 cm laceration that is approximately a 1-1/2 cm wide with 2 lateral flaps as well as 1 medial flap  Lateral flaps are roughly 0 5 cm x 0 25 cm in size  Medial flap is roughly 1 cm x 0 5 cm wide) present  Cervical back: Neck supple  Comments: Mild pulsatile bleeding from laceration injury  Consistent with arteriolar bleed  Skin:     General: Skin is warm and dry  Neurological:      Mental Status: She is alert  ED Medications  Medications   lidocaine (PF) (XYLOCAINE-MPF) 1 % injection 5 mL (5 mL Infiltration Given 7/13/22 1342)   tetanus-diphtheria-acellular pertussis (BOOSTRIX) IM injection 0 5 mL (0 5 mL Intramuscular Given 7/13/22 1528)       Diagnostic Studies  Results Reviewed     None                 No orders to display         Procedures  Laceration repair    Date/Time: 7/13/2022 5:10 PM  Performed by: Ai Lane MD  Authorized by: Ai Lane MD   Consent: Verbal consent obtained  Risks and benefits: risks, benefits and alternatives were discussed  Consent given by: patient  Patient identity confirmed: verbally with patient  Location: Left 2nd digit    Laceration length: 3 cm  Tendon involvement: none  Nerve involvement: none  Vascular damage: yes (Arteriolar)  Anesthesia: local infiltration and digital block    Anesthesia:  Local Anesthetic: lidocaine 1% without epinephrine  Anesthetic total: 7 mL    Sedation:  Patient sedated: no      Wound Dehiscence:  Superficial Wound Dehiscence: simple closure      Procedure Details:  Preparation: Patient was prepped and draped in the usual sterile fashion  Irrigation solution: saline  Irrigation method: syringe  Amount of cleaning: standard  Debridement: none  Degree of undermining: none  Skin closure: 4-0 nylon  Mucous membrane closure: 6-0 fast-absorbing plain gut  Number of sutures: 14  Technique: simple  Approximation: close  Approximation difficulty: complex  Dressing: gauze roll, non-adhesive packing strip and splint  Patient tolerance: patient tolerated the procedure well with no immediate complications            ED Course                             SBIRT 20yo+    Flowsheet Row Most Recent Value   SBIRT (25 yo +)    In order to provide better care to our patients, we are screening all of our patients for alcohol and drug use  Would it be okay to ask you these screening questions? Unable to answer at this time Filed at: 07/13/2022 1459                [facility]  Number of Diagnoses or Management Options  Laceration of left index finger without foreign body without damage to nail, initial encounter  Diagnosis management comments: 47-year-old female presenting to the emergency department with lacerations from accident involving her hedge trimmers  Physical exam notable for left 2nd digit between MCP and PIP there is a 3 x 1 5 cm laceration with multiple skin flaps  Wound was cleaned with copious amounts of saline  Digital block and wound infiltration was used for anesthesia  Single 6 0 fast absorbing suture was used for control of arteriolar bleed  Wound was closed with 13 simple interrupted 4 0 Ethilon  Patient tolerated the procedure well    Was instructed to return in 10-14 days for suture removal  Patient appears well, is nontoxic appearing, expresses understanding and agrees with plan of care at this time  In light of this patient would benefit from outpatient management  Patient Progress  Patient progress: improved      Disposition  Final diagnoses:   Laceration of left index finger without foreign body without damage to nail, initial encounter     Time reflects when diagnosis was documented in both MDM as applicable and the Disposition within this note     Time User Action Codes Description Comment    7/13/2022  3:16 PM ghazal [employer] Add [[id number]] Laceration of left index finger without foreign body without damage to nail, initial encounter       ED Disposition     ED Disposition   Discharge    Condition   Stable    Date/Time   Wed Jul 13, 2022  3:16 PM    [address] [place] [address] discharge to home/self care  Follow-up Information    None         Discharge Medication List as of 7/13/2022  3:18 PM      CONTINUE these medications which have NOT CHANGED    Details   fluticasone (FLONASE) 50 mcg/act nasal spray fluticasone 50 mcg/actuation nasal spray,suspension, Historical Med      levothyroxine (SYNTHROID) 50 mcg tablet Take 50 mcg by mouth daily, Historical Med      loratadine (CLARITIN) 10 mg tablet Daily, Historical Med           No discharge procedures on file  PDMP Review     None           ED Provider  Attending physically available and evaluated [facility]  I managed the patient along with the ED Attending      Electronically Signed by         Neha Choudhary MD  07/13/22 [address], [place]  07/13/22 6016

## 2022-07-13 NOTE — ED ATTENDING ATTESTATION
7/13/2022  IKatina MD, saw and evaluated the patient  I have discussed the patient with the resident/non-physician practitioner and agree with the resident's/non-physician practitioner's findings, Plan of Care, and MDM as documented in the resident's/non-physician practitioner's note, except where noted  All available labs and Radiology studies were reviewed  I was present for key portions of any procedure(s) performed by the resident/non-physician practitioner and I was immediately available to provide assistance  At this point I agree with the current assessment done in the Emergency Department  I have conducted an independent evaluation of this patient a history and physical is as follows:    49-year-old female presenting for evaluation of a laceration to left 2nd finger as result of of accidentally hitting at tremor  Patient 2+ radial pulse  Capillary refill less than 2 seconds to the fingertip with intact sensation to light touch  Full range of motion at the MCP, DIP, and PIP joints with normal strength  She has an approximately 3 cm laceration with exposed adipose tissue over the volar aspect of the left 2nd finger between the MCP and PIP joint  Laceration does not cross the joint line  There is a small arteriolar bleed  No exposed tendon or muscle  There are 3 skin flaps are within the laceration, all of which appear well vascularized  ED Course  ED Course as of 07/13/22 1825   Wed Jul 13, 2022   1525 Laceration repaired by resident physician with myself assisting at bedside during the entirety of the procedure  Wound edges were able to be approximated except for at 1 location at the very center of the wound where there is a skin avulsion with some exposed underlying adipose tissue  Patient counseled that this will need to heal by secondary intention  There is no tendon involvement  There was in arteriolar bleed which I was able to tie off with a figure of 8 suture  There does not appear to be involvement of the digital artery and the finger is well perfused with normal sensation and full range of motion  No bony tenderness  Following lac repair patient's finger was placed in a splint to avoid displacing sutures, though patient was counseled to remove the splint and perform gentle ROM activities at least daily  Signs and symptoms of infection were discussed as reasons to return to the ED immediately  Tetanus updated  Sutures to be removed in 10-14 days            Critical Care Time  Procedures

## 2022-07-25 ENCOUNTER — OFFICE VISIT (OUTPATIENT)
Dept: FAMILY MEDICINE CLINIC | Facility: CLINIC | Age: 52
End: 2022-07-25
Payer: COMMERCIAL

## 2022-07-25 VITALS
RESPIRATION RATE: 18 BRPM | BODY MASS INDEX: 29.91 KG/M2 | TEMPERATURE: 97 F | OXYGEN SATURATION: 96 % | DIASTOLIC BLOOD PRESSURE: 80 MMHG | HEART RATE: 96 BPM | WEIGHT: 175.2 LBS | SYSTOLIC BLOOD PRESSURE: 156 MMHG | HEIGHT: 64 IN

## 2022-07-25 DIAGNOSIS — J30.1 SEASONAL ALLERGIC RHINITIS DUE TO POLLEN: ICD-10-CM

## 2022-07-25 DIAGNOSIS — E78.2 MIXED HYPERLIPIDEMIA: ICD-10-CM

## 2022-07-25 DIAGNOSIS — S65.511A LACERATION OF BLOOD VESSEL OF LEFT INDEX FINGER, INITIAL ENCOUNTER: ICD-10-CM

## 2022-07-25 DIAGNOSIS — Z00.00 HEALTHCARE MAINTENANCE: ICD-10-CM

## 2022-07-25 DIAGNOSIS — Z11.4 SCREENING FOR HIV (HUMAN IMMUNODEFICIENCY VIRUS): ICD-10-CM

## 2022-07-25 DIAGNOSIS — G47.33 OBSTRUCTIVE SLEEP APNEA SYNDROME: ICD-10-CM

## 2022-07-25 DIAGNOSIS — Z11.59 NEED FOR HEPATITIS C SCREENING TEST: ICD-10-CM

## 2022-07-25 DIAGNOSIS — E55.9 VITAMIN D DEFICIENCY: ICD-10-CM

## 2022-07-25 DIAGNOSIS — E03.4 IDIOPATHIC ATROPHIC HYPOTHYROIDISM: Primary | ICD-10-CM

## 2022-07-25 PROBLEM — J30.9 ALLERGIC RHINITIS: Status: ACTIVE | Noted: 2022-07-25

## 2022-07-25 PROCEDURE — 3725F SCREEN DEPRESSION PERFORMED: CPT

## 2022-07-25 PROCEDURE — 99213 OFFICE O/P EST LOW 20 MIN: CPT

## 2022-07-25 NOTE — PROGRESS NOTES
Suture removal    Date/Time: 7/25/2022 2:58 PM  Performed by: Kirsty Glaser MD  Authorized by: Kirsty Glaser MD       Location:     Laterality:  Left    Location:  Upper extremity    Upper extremity location:  Hand    Hand location:  L index finger  Procedure details: Tools used:  Suture removal kit and tweezers    Wound appearance:  No sign(s) of infection, good wound healing and clean  Post-procedure details:     Post-removal:  No dressing applied    Patient tolerance of procedure: Tolerated well, no immediate complications      Assessment/Plan:         Problem List Items Addressed This Visit        Endocrine    Idiopathic atrophic hypothyroidism - Primary     Under control  Continue current medication  We will re-evaluate at next office visit  Relevant Orders    TSH, 3rd generation       Respiratory    Allergic rhinitis     Under control  Continue current medication  We will re-evaluate at next office visit  Obstructive sleep apnea syndrome     Under control with CPAP            Other    Mixed hyperlipidemia     Under control  Continue current medication  We will re-evaluate at next office visit  Relevant Orders    Lipid Panel with Direct LDL reflex    Vitamin D deficiency    Relevant Orders    Vitamin D 25 hydroxy      Other Visit Diagnoses     Screening for HIV (human immunodeficiency virus)        Relevant Orders    HIV 1/2 Antigen/Antibody (4th Generation) w Reflex [facility]    Need for hepatitis C screening test        Relevant Orders    Hepatitis C antibody    Healthcare maintenance        Relevant Orders    CBC and differential    Comprehensive metabolic panel    UA w Reflex to Microscopic w Reflex to Culture    Laceration of blood vessel of left index finger, initial encounter        Relevant Orders    Suture removal (Completed)            Subjective:      Patient ID: Latonya Huitron is a 46 y o  female      Patient here for her chronic medical problems and review of the labs and imaging if it is applicable  Currently has no specific complaints other than mentioned in the review of systems  Patient sustained complex laceration left index finger as injured with  and was seen in the emergency room and 13 stitches were taken now here for wound check and removal of the stitches      The following portions of the patient's history were reviewed and updated as appropriate:   Past Medical History:  She has a past medical history of Allergic rhinitis, Hyperlipidemia, Hypothyroidism, and Sleep apnea ,  _______________________________________________________________________  Medical Problems:  does not have any pertinent problems on file ,  _______________________________________________________________________  Past Surgical History:   has a past surgical history that includes Knee surgery; Breast biopsy (Right); and Mammo (historical) (11/02/2017)  ,  _______________________________________________________________________  Family History:  family history includes Arthritis in her mother; Cancer in her paternal grandfather; Diabetes in her father; No Known Problems in her daughter, maternal aunt, maternal aunt, maternal grandfather, maternal grandmother, paternal aunt, paternal grandmother, sister, sister, sister, and sister  ,  _______________________________________________________________________  Social History:   reports that she has never smoked  She has never used smokeless tobacco  She reports current alcohol use  She reports that she does not use drugs  ,  _______________________________________________________________________  Allergies:  has No Known Allergies     _______________________________________________________________________  Current Outpatient Medications   Medication Sig Dispense Refill    fluticasone (FLONASE) 50 mcg/act nasal spray fluticasone 50 mcg/actuation nasal spray,suspension      levothyroxine 50 mcg tablet Take 50 mcg by mouth daily  loratadine (CLARITIN) 10 mg tablet Daily       No current facility-administered medications for this visit      _______________________________________________________________________  Review of Systems   Constitutional: Negative for fatigue  HENT: Negative for congestion, ear pain and sneezing  Eyes: Negative for pain and redness  Respiratory: Negative for cough, chest tightness and shortness of breath  Cardiovascular: Negative for chest pain, palpitations and leg swelling  Gastrointestinal: Negative for abdominal pain, diarrhea, nausea and vomiting  Genitourinary: Negative for dysuria, frequency and hematuria  Musculoskeletal: Negative for arthralgias and back pain  Skin: Negative for color change and rash  Neurological: Negative for dizziness, weakness and headaches  Psychiatric/Behavioral: Negative for behavioral problems  Objective:  Vitals:    07/25/22 1418   BP: 156/80   BP Location: Left arm   Patient Position: Sitting   Cuff Size: Large   Pulse: 96   Resp: 18   Temp: (!) 97 °F (36 1 °C)   TempSrc: Temporal   SpO2: 96%   Weight: 79 5 kg (175 lb 3 2 oz)   Height: 5' 4" (1 626 m)     Body mass index is 30 07 kg/m²  Physical Exam  Constitutional:       Appearance: Normal appearance  HENT:      Head: Normocephalic and atraumatic  Right Ear: Tympanic membrane normal       Left Ear: Tympanic membrane normal       Nose: Nose normal       Mouth/Throat:      Mouth: Mucous membranes are moist       Pharynx: Oropharynx is clear  Eyes:      Extraocular Movements: Extraocular movements intact  Pupils: Pupils are equal, round, and reactive to light  Cardiovascular:      Rate and Rhythm: Normal rate and regular rhythm  Pulses: Normal pulses  Heart sounds: Normal heart sounds  Pulmonary:      Effort: Pulmonary effort is normal       Breath sounds: Normal breath sounds  Abdominal:      General: Abdomen is flat   Bowel sounds are normal       Palpations: Abdomen is soft  Genitourinary:     General: Normal vulva  Rectum: Normal    Musculoskeletal:         General: Normal range of motion  Cervical back: Normal range of motion and neck supple  Skin:     General: Skin is warm and dry  Capillary Refill: Capillary refill takes 2 to 3 seconds  Neurological:      General: No focal deficit present  Mental Status: She is alert  Psychiatric:         Mood and Affect: Mood normal        BMI Counseling: Body mass index is 30 07 kg/m²  The BMI is above normal  Nutrition recommendations include decreasing portion sizes, decreasing fast food intake, limiting drinks that contain sugar and increasing intake of lean protein  Exercise recommendations include exercising 3-5 times per week  Rationale for BMI follow-up plan is due to patient being overweight or obese  Depression Screening and Follow-up Plan: Patient was screened for depression during today's encounter  They screened negative with a PHQ-2 score of 0

## 2022-08-03 ENCOUNTER — OFFICE VISIT (OUTPATIENT)
Dept: FAMILY MEDICINE CLINIC | Facility: CLINIC | Age: 52
End: 2022-08-03
Payer: COMMERCIAL

## 2022-08-03 VITALS
TEMPERATURE: 98.3 F | BODY MASS INDEX: 29.71 KG/M2 | SYSTOLIC BLOOD PRESSURE: 121 MMHG | HEART RATE: 80 BPM | DIASTOLIC BLOOD PRESSURE: 79 MMHG | WEIGHT: 174 LBS | RESPIRATION RATE: 18 BRPM | HEIGHT: 64 IN | OXYGEN SATURATION: 95 %

## 2022-08-03 DIAGNOSIS — S65.511D LACERATION OF BLOOD VESSEL OF LEFT INDEX FINGER, SUBSEQUENT ENCOUNTER: Primary | ICD-10-CM

## 2022-08-03 PROCEDURE — 99213 OFFICE O/P EST LOW 20 MIN: CPT

## 2022-08-03 NOTE — PROGRESS NOTES
Assessment/Plan:         Problem List Items Addressed This Visit    None           Subjective:      Patient ID: Latonya Huitron is a 46 y o  female  Patient is here for  recheck of wound on the left index finger and remaining stitches to be removed  No redness or swelling  No drainage  No fever or chills  The following portions of the patient's history were reviewed and updated as appropriate:   Past Medical History:  She has a past medical history of Allergic rhinitis, Hyperlipidemia, Hypothyroidism, and Sleep apnea ,  _______________________________________________________________________  Medical Problems:  does not have any pertinent problems on file ,  _______________________________________________________________________  Past Surgical History:   has a past surgical history that includes Knee surgery; Breast biopsy (Right); and Mammo (historical) (11/02/2017)  ,  _______________________________________________________________________  Family History:  family history includes Arthritis in her mother; Cancer in her paternal grandfather; Diabetes in her father; No Known Problems in her daughter, maternal aunt, maternal aunt, maternal grandfather, maternal grandmother, paternal aunt, paternal grandmother, sister, sister, sister, and sister  ,  _______________________________________________________________________  Social History:   reports that she has never smoked  She has never used smokeless tobacco  She reports current alcohol use  She reports that she does not use drugs  ,  _______________________________________________________________________  Allergies:  has No Known Allergies     _______________________________________________________________________  Current Outpatient Medications   Medication Sig Dispense Refill    fluticasone (FLONASE) 50 mcg/act nasal spray fluticasone 50 mcg/actuation nasal spray,suspension      levothyroxine 50 mcg tablet Take 50 mcg by mouth daily      loratadine (CLARITIN) 10 mg tablet Daily       No current facility-administered medications for this visit      _______________________________________________________________________  Review of Systems   Constitutional: Negative for chills, fatigue and fever  HENT: Negative for congestion, ear pain, rhinorrhea, sneezing and sore throat  Eyes: Negative for pain, redness and visual disturbance  Respiratory: Negative for cough, chest tightness and shortness of breath  Cardiovascular: Negative for chest pain, palpitations and leg swelling  Gastrointestinal: Negative for abdominal pain, blood in stool, diarrhea, nausea and vomiting  Endocrine: Negative for cold intolerance and heat intolerance  Genitourinary: Negative for dysuria, frequency and hematuria  Musculoskeletal: Negative for arthralgias and back pain  Skin: Negative for color change and rash  Neurological: Negative for dizziness, weakness, light-headedness, numbness and headaches  Hematological: Does not bruise/bleed easily  Psychiatric/Behavioral: Negative for behavioral problems and confusion  Objective:  Vitals:    08/03/22 1237   BP: 121/79   BP Location: Left arm   Patient Position: Sitting   Cuff Size: Standard   Pulse: 80   Resp: 18   Temp: 98 3 °F (36 8 °C)   SpO2: 95%   Weight: 78 9 kg (174 lb)   Height: 5' 4" (1 626 m)     Body mass index is 29 87 kg/m²  Physical Exam  Constitutional:       General: She is not in acute distress  Appearance: Normal appearance  She is not ill-appearing, toxic-appearing or diaphoretic  HENT:      Head: Normocephalic and atraumatic  Right Ear: Tympanic membrane normal       Left Ear: Tympanic membrane normal       Nose: Nose normal  No congestion  Mouth/Throat:      Mouth: Mucous membranes are moist    Eyes:      General: No scleral icterus  Right eye: No discharge  Left eye: No discharge  Extraocular Movements: Extraocular movements intact  Conjunctiva/sclera: Conjunctivae normal       Pupils: Pupils are equal, round, and reactive to light  Cardiovascular:      Rate and Rhythm: Normal rate and regular rhythm  Pulses: Normal pulses  Heart sounds: Normal heart sounds  No murmur heard  No gallop  Pulmonary:      Effort: Pulmonary effort is normal  No respiratory distress  Breath sounds: Normal breath sounds  No wheezing, rhonchi or rales  Abdominal:      General: Abdomen is flat  Bowel sounds are normal  There is no distension  Palpations: Abdomen is soft  Tenderness: There is no abdominal tenderness  There is no guarding  Musculoskeletal:         General: No swelling or tenderness  Normal range of motion  Cervical back: Normal range of motion and neck supple  No rigidity  Lymphadenopathy:      Cervical: No cervical adenopathy  Skin:     General: Skin is warm  Capillary Refill: Capillary refill takes 2 to 3 seconds  Coloration: Skin is not jaundiced  Findings: No bruising or rash  Comments: Wound on the left index finger healing well there are 2 remaining stitches were taken out   Neurological:      General: No focal deficit present  Mental Status: She is alert and oriented to person, place, and time  Mental status is at baseline  Gait: Gait normal    Psychiatric:         Mood and Affect: Mood normal        Suture removal    Date/Time: 8/3/2022 1:10 PM  Performed by: Bre Macias MD  Authorized by: Bre Macias MD   Universal Protocol:  Consent: Verbal consent obtained  Consent given by: patient  Patient identity confirmed: verbally with patient        Location:     Laterality:  Left    Location: index finger  Procedure details:      Tools used:  Suture removal kit    Wound appearance:  No sign(s) of infection, good wound healing and clean  Post-procedure details:     Post-removal:  Band-Aid applied

## 2022-08-15 DIAGNOSIS — E03.9 HYPOTHYROIDISM (ACQUIRED): Primary | ICD-10-CM

## 2022-08-15 RX ORDER — LEVOTHYROXINE SODIUM 0.05 MG/1
50 TABLET ORAL DAILY
Qty: 90 TABLET | Refills: 1 | Status: SHIPPED | OUTPATIENT
Start: 2022-08-15

## 2022-08-26 ENCOUNTER — TELEPHONE (OUTPATIENT)
Dept: OBGYN CLINIC | Facility: HOSPITAL | Age: 52
End: 2022-08-26

## 2022-08-26 NOTE — TELEPHONE ENCOUNTER
Patient is calling to request an appt with Dr Deb Moore,      She stated about a month about she had a finger laceration from a weed whacker in her left index finger and now it goes numb and tingly and thinks she may have damaged the nerves        CB: [phone]

## 2022-09-07 ENCOUNTER — OFFICE VISIT (OUTPATIENT)
Dept: OBGYN CLINIC | Facility: CLINIC | Age: 52
End: 2022-09-07
Payer: COMMERCIAL

## 2022-09-07 VITALS
HEIGHT: 64 IN | HEART RATE: 79 BPM | WEIGHT: 173.6 LBS | RESPIRATION RATE: 16 BRPM | BODY MASS INDEX: 29.64 KG/M2 | DIASTOLIC BLOOD PRESSURE: 84 MMHG | SYSTOLIC BLOOD PRESSURE: 132 MMHG

## 2022-09-07 DIAGNOSIS — S61.211A LACERATION OF LEFT INDEX FINGER WITHOUT FOREIGN BODY WITHOUT DAMAGE TO NAIL, INITIAL ENCOUNTER: Primary | ICD-10-CM

## 2022-09-07 PROCEDURE — 99204 OFFICE O/P NEW MOD 45 MIN: CPT | Performed by: SURGERY

## 2022-09-07 NOTE — PROGRESS NOTES
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, DOI 7/13/22  The etiology of a partial radial digital nerve injury was discussed along with treatment options  It was discussed with Jeffrey Rebolledo that her laceration does not cross the mid axial line  She has good radial digital nerve sensation up to her index finger PIP joint  Her sensation gradually decreases distally there after  It was discussed with Jeffrey Rebolledo that this should continue to improve over the next 3-4 months, but may nerve resolve completely  We briefly discussed nerve exploration, which she is not interested in  She is currently already taking Vit  B 12  Her finger ROM is good, she is lacking the last few degrees of full PIP flexion due to intrinsic tightness  She was advised to perform place and hold exercises at home, she may call the office for a OT script if she feels motion is not improving at home  She was advised to perform scar massage at home with lotion, this will help combat a nerve neuroma and help with nerve sensitivity  She can expect to see color changes in the finger for up 18 months from injury, she is not dysvascular/ischemic and has good capillary refill  Swelling may also persist for a few months  Follow up in 6 weeks time for re-evaluation  The patient verbalized understanding of exam findings and treatment plan  We engaged in the shared decision-making process and treatment options were discussed at length with the patient  Surgical and conservative management discussed today along with risks and benefits      Diagnoses and all orders for this visit:    Laceration of left index finger without foreign body without damage to nail, initial encounter        Follow Up:  Return in about 6 weeks (around 10/19/2022)  To Do Next Visit:  Re-evaluation of current issue      ____________________________________________________________________________________________________________________________________________      CHIEF COMPLAINT:  Chief Complaint   Patient presents with    Left Index Finger - Tingling     /Laceration of finger DOI 7/13/22       SUBJECTIVE:  Cammy Mancera is a 46y o  year old RHD female who presents to the office today for evaluation of her left index finger  She cut her left index finger while using an electric  on 7/13/22  She presented to the ED after injury, at which point her laceration was sutured  She did follow up with her PCP for suture removal  She notes numbness to the finger  She notes numbness to the volar radial aspect  She notes numbness use to be on the palmar more radial aspect but this has since resolved  She notes that she can still feel the radial aspect of the finger but when she touches the are it feels different, or like her "finger fell asleep"  She notes that her finger flexion is not full either  She feels tightness to her scar with full finger extension  She also notes that the finger is still slightly discolored         Pain/symptom timing:  Worse during the day when active  Pain/symptom context:  Worse with activites and work  Pain/symptom modifying factors:  Rest makes better, activities make worse  Pain/symptom associated signs/symptoms: none    Prior treatment   · NSAIDsNo   · Injections No   · Bracing/Orthotics No    Physical Therapy No     I have personally reviewed all the relevant PMH, PSH, SH, FH, Medications and allergies      PAST MEDICAL HISTORY:  Past Medical History:   Diagnosis Date    Allergic rhinitis     Hyperlipidemia     Hypothyroidism     Sleep apnea     CPAP       PAST SURGICAL HISTORY:  Past Surgical History:   Procedure Laterality Date    BREAST BIOPSY Right     Benign    KNEE SURGERY      MAMMO (HISTORICAL)  11/02/2017    10/31/2016, 10/29/2015       FAMILY HISTORY:  Family History   Problem Relation Age of Onset    Diabetes Father     Arthritis Mother     No Known Problems Sister     No Known Problems Daughter     No Known Problems Maternal Grandmother     No Known Problems Maternal Grandfather     No Known Problems Paternal Grandmother     Cancer Paternal Grandfather     No Known Problems Sister     No Known Problems Sister     No Known Problems Sister     No Known Problems Maternal Aunt     No Known Problems Maternal Aunt     No Known Problems Paternal Aunt        SOCIAL HISTORY:  Social History     Tobacco Use    Smoking status: Never Smoker    Smokeless tobacco: Never Used   Vaping Use    Vaping Use: Never used   Substance Use Topics    Alcohol use: Yes     Comment: Social    Drug use: No       MEDICATIONS:    Current Outpatient Medications:     fluticasone (FLONASE) 50 mcg/act nasal spray, fluticasone 50 mcg/actuation nasal spray,suspension, Disp: , Rfl:     levothyroxine 50 mcg tablet, Take 1 tablet (50 mcg total) by mouth daily, Disp: 90 tablet, Rfl: 1    loratadine (CLARITIN) 10 mg tablet, Daily, Disp: , Rfl:     ALLERGIES:  No Known Allergies        REVIEW OF SYSTEMS:  Review of Systems   Constitutional: Negative for chills, fever and unexpected weight change  HENT: Negative for hearing loss, nosebleeds and sore throat  Eyes: Negative for pain, redness and visual disturbance  Respiratory: Negative for cough, shortness of breath and wheezing  Cardiovascular: Negative for chest pain, palpitations and leg swelling  Gastrointestinal: Negative for abdominal pain, nausea and vomiting  Endocrine: Negative for polydipsia and polyuria  Genitourinary: Negative for difficulty urinating and hematuria  Musculoskeletal: Negative for arthralgias, joint swelling and myalgias  Skin: Positive for wound  Negative for rash     Neurological: Negative for dizziness, numbness and headaches  Psychiatric/Behavioral: Negative for decreased concentration, dysphoric mood and suicidal ideas  The patient is not nervous/anxious  VITALS:  Vitals:    09/07/22 1144   BP: 132/84   Pulse: 79   Resp: 16       LABS:  HgA1c: No results found for: HGBA1C  BMP: No results found for: GLUCOSE, CALCIUM, NA, K, CO2, CL, BUN, CREATININE    _____________________________________________________  PHYSICAL EXAMINATION:  General: well developed and well nourished, alert, oriented times 3 and appears comfortable  Psychiatric: Normal  HEENT: Normocephalic, Atraumatic Trachea Midline, No torticollis  Pulmonary: No audible wheezing or respiratory distress   Abdomen/GI: Non tender, non distended   Cardiovascular: No pitting edema, 2+ radial pulse   Skin: No Masses, No Erythema, No Fluctuation, No Ulcerations  Neurovascular: Sensation Intact to the Median, Ulnar, Radial Nerve, Motor Intact to the Median, Ulnar, Radial Nerve and Pulses Intact  Musculoskeletal: Normal, except as noted in detailed exam and in HPI        MUSCULOSKELETAL EXAMINATION:    Left index finger:     No erythema, ecchymosis or significant edema  Healed laceration volar index finger digital palmar crease   Normal sensation ulnar aspect of the finger   Normal sensation radial aspect of the finger to the PIP joint   Distal to the PIP joint sensation gradually decreases   Full finger extension   Near full flexion, is lacking the last few degrees of PIP joint flexion kinga to intrinsic tightness       ___________________________________________________  STUDIES REVIEWED:  No imaging to review           PROCEDURES PERFORMED:  Procedures  No Procedures performed today    _____________________________________________________      Mckenzie Guerrero    I,:  Marry Page am acting as a scribe while in the presence of the attending physician :       I,:  Maye Emerson MD personally performed the services described in this documentation    as scribed in my presence :

## 2022-10-24 ENCOUNTER — OFFICE VISIT (OUTPATIENT)
Dept: OBGYN CLINIC | Facility: HOSPITAL | Age: 52
End: 2022-10-24
Payer: COMMERCIAL

## 2022-10-24 VITALS
HEART RATE: 74 BPM | SYSTOLIC BLOOD PRESSURE: 125 MMHG | WEIGHT: 176 LBS | RESPIRATION RATE: 16 BRPM | DIASTOLIC BLOOD PRESSURE: 82 MMHG | BODY MASS INDEX: 30.05 KG/M2 | HEIGHT: 64 IN

## 2022-10-24 DIAGNOSIS — S61.211A LACERATION OF LEFT INDEX FINGER WITHOUT FOREIGN BODY WITHOUT DAMAGE TO NAIL, INITIAL ENCOUNTER: Primary | ICD-10-CM

## 2022-10-24 PROCEDURE — 99213 OFFICE O/P EST LOW 20 MIN: CPT | Performed by: SURGERY

## 2022-10-24 NOTE — PROGRESS NOTES
ASSESSMENT/PLAN:      28-year-old female who has a healed laceration of the left index finger with partial radial digital nerve injury, 07/13/2022  At this present time is recommended to continue to do the scar massage this will help flatten the scar up and also desensitize the nerves  Patient maintains full range of motion of the finger  We will follow up with the patient as needed  The patient verbalized understanding of exam findings and treatment plan  We engaged in the shared decision-making process and treatment options were discussed at length with the patient  Surgical and conservative management discussed today along with risks and benefits  Diagnoses and all orders for this visit:    Laceration of left index finger without foreign body without damage to nail, initial encounter        Follow Up:  Return if symptoms worsen or fail to improve  To Do Next Visit:  Re-evaluation of current issue    ____________________________________________________________________________________________________________________________________________      CHIEF COMPLAINT:  Chief Complaint   Patient presents with   • Left Index Finger - Laceration, Follow-up       SUBJECTIVE:  Dinora Elkins is a 46y o  year old RHD female who presents today for 6 week follow-up for her healed left index finger laceration, likely partial radial digital nerve injury, date of injury 07/13/2022  Her finger ROM is good, she is lacking the last few degrees of full PIP flexion due to intrinsic tightness       I have personally reviewed all the relevant PMH, PSH, SH, FH, Medications and allergies       PAST MEDICAL HISTORY:  Past Medical History:   Diagnosis Date   • Allergic rhinitis    • Hyperlipidemia    • Hypothyroidism    • Sleep apnea     CPAP       PAST SURGICAL HISTORY:  Past Surgical History:   Procedure Laterality Date   • BREAST BIOPSY Right     Benign   • KNEE SURGERY     • MAMMO (HISTORICAL)  11/02/2017    10/31/2016, 10/29/2015       FAMILY HISTORY:  Family History   Problem Relation Age of Onset   • Diabetes Father    • Arthritis Mother    • No Known Problems Sister    • No Known Problems Daughter    • No Known Problems Maternal Grandmother    • No Known Problems Maternal Grandfather    • No Known Problems Paternal Grandmother    • Cancer Paternal Grandfather    • No Known Problems Sister    • No Known Problems Sister    • No Known Problems Sister    • No Known Problems Maternal Aunt    • No Known Problems Maternal Aunt    • No Known Problems Paternal Aunt        SOCIAL HISTORY:  Social History     Tobacco Use   • Smoking status: Never Smoker   • Smokeless tobacco: Never Used   Vaping Use   • Vaping Use: Never used   Substance Use Topics   • Alcohol use: Yes     Comment: Social   • Drug use: No       MEDICATIONS:    Current Outpatient Medications:   •  fluticasone (FLONASE) 50 mcg/act nasal spray, fluticasone 50 mcg/actuation nasal spray,suspension, Disp: , Rfl:   •  levothyroxine 50 mcg tablet, Take 1 tablet (50 mcg total) by mouth daily, Disp: 90 tablet, Rfl: 1  •  loratadine (CLARITIN) 10 mg tablet, Take 10 mg by mouth Daily, Disp: , Rfl:     ALLERGIES:  No Known Allergies    REVIEW OF SYSTEMS:  Review of Systems    VITALS:  Vitals:    10/24/22 0806   BP: 125/82   Pulse: 74   Resp: 16       LABS:  HgA1c: No results found for: HGBA1C  BMP: No results found for: GLUCOSE, CALCIUM, NA, K, CO2, CL, BUN, CREATININE    _____________________________________________________  PHYSICAL EXAMINATION:  General: well developed and well nourished, alert, oriented times 3 and appears comfortable  Psychiatric: Normal  HEENT: Normocephalic, Atraumatic Trachea Midline, No torticollis  Pulmonary: No audible wheezing or respiratory distress   Cardiovascular: No pitting edema, 2+ radial pulse   Abdominal/GI: abdomen non tender, non distended   Skin: No masses, erythema, lacerations, fluctation, ulcerations  Neurovascular: Sensation Intact to the Median, Ulnar, Radial Nerve, Motor Intact to the Median, Ulnar, Radial Nerve and Pulses Intact  Musculoskeletal: Normal, except as noted in detailed exam and in HPI        MUSCULOSKELETAL EXAMINATION:    Left index finger:   Full ROM of the index finger  Scar tissue thickening just proximal to the Mp joint  Hypersensitive over he middle phalanx along the radial digital nerve distribution  Brisk capillary refill   ___________________________________________________  STUDIES REVIEWED:  No images reviewed at this time      PROCEDURES PERFORMED:  Procedures  No Procedures performed today    _____________________________________________________      Lonell Jeans    I,:  Jordan Castillo am acting as a scribe while in the presence of the attending physician :       I,:  Tasneem Mesa MD personally performed the services described in this documentation    as scribed in my presence :

## 2023-02-02 ENCOUNTER — OFFICE VISIT (OUTPATIENT)
Dept: FAMILY MEDICINE CLINIC | Facility: CLINIC | Age: 53
End: 2023-02-02

## 2023-02-02 VITALS
TEMPERATURE: 97 F | HEIGHT: 64 IN | BODY MASS INDEX: 29.88 KG/M2 | OXYGEN SATURATION: 96 % | RESPIRATION RATE: 16 BRPM | SYSTOLIC BLOOD PRESSURE: 137 MMHG | HEART RATE: 85 BPM | WEIGHT: 175 LBS | DIASTOLIC BLOOD PRESSURE: 80 MMHG

## 2023-02-02 DIAGNOSIS — E55.9 VITAMIN D DEFICIENCY: ICD-10-CM

## 2023-02-02 DIAGNOSIS — E78.2 MIXED HYPERLIPIDEMIA: ICD-10-CM

## 2023-02-02 DIAGNOSIS — Z00.00 ANNUAL PHYSICAL EXAM: Primary | ICD-10-CM

## 2023-02-02 DIAGNOSIS — Z12.11 SCREENING FOR COLON CANCER: ICD-10-CM

## 2023-02-02 DIAGNOSIS — E03.9 HYPOTHYROIDISM (ACQUIRED): ICD-10-CM

## 2023-02-02 PROBLEM — E03.4 IDIOPATHIC ATROPHIC HYPOTHYROIDISM: Status: RESOLVED | Noted: 2022-07-25 | Resolved: 2023-02-02

## 2023-02-02 RX ORDER — LEVOTHYROXINE SODIUM 0.05 MG/1
50 TABLET ORAL DAILY
Qty: 90 TABLET | Refills: 1 | Status: SHIPPED | OUTPATIENT
Start: 2023-02-02

## 2023-02-02 NOTE — ASSESSMENT & PLAN NOTE
Annual physical completed today  Reviewed relevant cancer screenings  Labs reviewed and ordered if needed  Discussed healthy diet and exercise as tolerated for 3-5 times per week  Reviewed immunizations and offered immunizations if due   Follow up in 1 year for annual physical

## 2023-02-02 NOTE — PROGRESS NOTES
ADULT ANNUAL [facility] PRIMARY CARE    NAME: Taran Armstrong  AGE: 45 y o  SEX: female  : 1970     DATE: 2023     Assessment and Plan:     Problem List Items Addressed This Visit        Endocrine    Hypothyroidism (acquired)     TSH is at goal  Refill provided  Recheck in 1 year  Follow up in office in 6 months  Relevant Medications    levothyroxine 50 mcg tablet       Other    Annual physical exam - Primary     Annual physical completed today  Reviewed relevant cancer screenings  Labs reviewed and ordered if needed  Discussed healthy diet and exercise as tolerated for 3-5 times per week  Reviewed immunizations and offered immunizations if due  Follow up in 1 year for annual physical           Mixed hyperlipidemia     HDL low, discussed healthy fats, diet and exercise  Recheck in 1 year  Vitamin D deficiency     Vitamin D level 52 today  Continue 2000 unit daily supplement  Other Visit Diagnoses     Screening for colon cancer        Relevant Orders    Ambulatory Referral to Gastroenterology          Immunizations and preventive care screenings were discussed with patient today  Appropriate education was printed on patient's after visit summary  Counseling:  · Exercise: the importance of regular exercise/physical activity was discussed  Recommend exercise 3-5 times per week for at least 30 minutes  Return in about 6 months (around 2023) for Recheck  Chief Complaint:     Chief Complaint   Patient presents with   • Follow-up     6 Month follow up       History of Present Illness:     Adult Annual Physical   Patient here for a comprehensive physical exam  The patient reports no problems  She had labs done recently which were reviewed  She is compliant with her thyroid mediation and denies any issues  Diet and Physical Activity  · Diet/Nutrition: Just started weight watchers     · Exercise: Starting to exercise- part of her New Year's resolution  Depression Screening  PHQ-2/9 Depression Screening    Little interest or pleasure in doing things: 0 - not at all  Feeling down, depressed, or hopeless: 0 - not at all       [address] [place] [address]  · Sleep: sleeps well  · Hearing: No issues  · Vision: No issues    /GYN Health  · Menopausal     Review of Systems:     Review of Systems   Constitutional: Negative for chills and fever  HENT: Negative for congestion, rhinorrhea and sore throat  Eyes: Negative for visual disturbance  Respiratory: Negative for cough and shortness of breath  Cardiovascular: Negative for chest pain  Gastrointestinal: Negative for abdominal distention, constipation, diarrhea, nausea and vomiting  Endocrine: Negative for polyuria  Genitourinary: Negative for dysuria and frequency  Musculoskeletal: Negative for back pain  Skin: Negative for rash  Neurological: Negative for headaches  Psychiatric/Behavioral: Negative for dysphoric mood        Past Medical History:     Past Medical History:   Diagnosis Date   • Allergic rhinitis    • Hyperlipidemia    • Hypothyroidism    • Sleep apnea     CPAP      Past Surgical History:     Past Surgical History:   Procedure Laterality Date   • BREAST BIOPSY Right     Benign   • KNEE SURGERY     • MAMMO (HISTORICAL)  11/02/2017    10/31/2016, 10/29/2015      Social History:     Social History     Socioeconomic History   • Marital status:      Spouse name: None   • Number of children: 2   • Years of education: None   • Highest education level: None   Occupational History   • None   Tobacco Use   • Smoking status: Never   • Smokeless tobacco: Never   Vaping Use   • Vaping Use: Never used   Substance and Sexual Activity   • Alcohol use: Yes     Comment: Social   • Drug use: No   • Sexual activity: Not Currently     Partners: Male   Other Topics Concern   • None   Social History Narrative    Daily caffeine consumption, 2-3 serv/day     Exercises 1-2 times per week     Full-time employment      Social Determinants of Health     Financial Resource Strain: Not on file   Food Insecurity: Not on file   Transportation Needs: Not on file   Physical Activity: Not on file   Stress: Not on file   Social Connections: Not on file   Intimate Partner Violence: Not on file   Housing Stability: Not on file      Family History:     Family History   Problem Relation Age of Onset   • Diabetes Father    • Arthritis Mother    • No Known Problems Sister    • No Known Problems Daughter    • No Known Problems Maternal Grandmother    • No Known Problems Maternal Grandfather    • No Known Problems Paternal Grandmother    • Cancer Paternal Grandfather    • No Known Problems Sister    • No Known Problems Sister    • No Known Problems Sister    • No Known Problems Maternal Aunt    • No Known Problems Maternal Aunt    • No Known Problems Paternal Aunt       Current Medications:     Current Outpatient Medications   Medication Sig Dispense Refill   • fluticasone (FLONASE) 50 mcg/act nasal spray fluticasone 50 mcg/actuation nasal spray,suspension     • levothyroxine 50 mcg tablet Take 1 tablet (50 mcg total) by mouth daily 90 tablet 1   • loratadine (CLARITIN) 10 mg tablet Take 10 mg by mouth Daily       No current facility-administered medications for this visit  Allergies:     No Known Allergies   Physical Exam:     /80 (BP Location: Left arm, Patient Position: Sitting, Cuff Size: Standard)   Pulse 85   Temp (!) 97 °F (36 1 °C) (Tympanic)   Resp 16   Ht 5' 4" (1 626 m)   Wt 79 4 kg (175 lb)   SpO2 96%   BMI 30 04 kg/m²     Physical Exam  Constitutional:       General: She is not in acute distress  HENT:      Right Ear: External ear normal       Left Ear: External ear normal       Nose: Nose normal       Mouth/Throat:      Mouth: Mucous membranes are moist    Eyes:      Extraocular Movements: Extraocular movements intact        Conjunctiva/sclera: Conjunctivae normal       Pupils: Pupils are equal, round, and reactive to light  Cardiovascular:      Rate and Rhythm: Normal rate and regular rhythm  Heart sounds: No murmur heard  Pulmonary:      Effort: Pulmonary effort is normal       Breath sounds: Normal breath sounds  No wheezing  Abdominal:      General: There is no distension  Palpations: Abdomen is soft  Tenderness: There is no abdominal tenderness  Musculoskeletal:      Cervical back: Normal range of motion  Right lower leg: No edema  Left lower leg: No edema  Skin:     Findings: No rash  Neurological:      Mental Status: She is alert and oriented to person, place, and time  Psychiatric:         Mood and Affect: Mood normal          Behavior: Behavior normal          Thought Content:  Thought content normal          Judgment: Judgment normal           Eun Kapoor MD  [facility] [address] [facility]

## 2023-07-28 ENCOUNTER — RA CDI HCC (OUTPATIENT)
Dept: OTHER | Facility: HOSPITAL | Age: 53
End: 2023-07-28

## 2023-07-28 NOTE — PROGRESS NOTES
[facility] coding opportunities       Chart reviewed, no opportunity found: CHART REVIEWED, NO OPPORTUNITY FOUND        Patients Insurance        Commercial Insurance: [employer]

## 2023-08-11 ENCOUNTER — PREP FOR PROCEDURE (OUTPATIENT)
Age: 53
End: 2023-08-11

## 2023-08-11 ENCOUNTER — TELEPHONE (OUTPATIENT)
Age: 53
End: 2023-08-11

## 2023-08-11 DIAGNOSIS — Z12.11 SCREENING FOR COLON CANCER: Primary | ICD-10-CM

## 2023-08-11 NOTE — TELEPHONE ENCOUNTER
Scheduled date of colonoscopy (as of today): 9/11/2023    Physician performing colonoscopy: Shannen    Location of colonoscopy:[facility] [facility],[address]    Bowel prep reviewed with patient: Miralax/Dulcolax    Instructions reviewed with patient by: Danielle FELIX   Mail out    Clearances: None

## 2023-08-11 NOTE — TELEPHONE ENCOUNTER
08/11/23  Screened by: Ru Dowling    Referring Provider: Ki Starkey    Pre- Screening:     Weight: 170lbs  Height: 5' 4"  BMI: 29.2    Has patient been referred for a routine screening Colonoscopy? yes  Is the patient between 43-73 years old? yes      Previous Colonoscopy: no        SCHEDULING STAFF: If the patient is between 45yrs-49yrs, please advise patient to confirm benefits/coverage with their insurance company for a routine screening colonoscopy, some insurance carriers will only cover at [address] [place] [address] or older. If the patient is over 66years old, please schedule an office visit. Does the patient want to see a Gastroenterologist prior to their procedure OR are they having any GI symptoms? no    Has the patient been hospitalized or had abdominal surgery in the past 6 months? no    Does the patient use supplemental oxygen? no    Does the patient take Coumadin, Lovenox, Plavix, Elliquis, Xarelto, or other blood thinning medication? no    Has the patient had a stroke, cardiac event, or stent placed in the past year? no    SCHEDULING STAFF: If patient answers NO to above questions, then schedule procedure. If patient answers YES to above questions, then schedule office appointment. If patient is between 45yrs - 49yrs, please advise patient that we will have to confirm benefits & coverage with their insurance company for a routine screening colonoscopy.

## 2023-08-14 ENCOUNTER — OFFICE VISIT (OUTPATIENT)
Dept: FAMILY MEDICINE CLINIC | Facility: CLINIC | Age: 53
End: 2023-08-14
Payer: COMMERCIAL

## 2023-08-14 VITALS
TEMPERATURE: 99.2 F | SYSTOLIC BLOOD PRESSURE: 126 MMHG | WEIGHT: 172 LBS | OXYGEN SATURATION: 98 % | HEIGHT: 64 IN | HEART RATE: 84 BPM | BODY MASS INDEX: 29.37 KG/M2 | DIASTOLIC BLOOD PRESSURE: 82 MMHG

## 2023-08-14 DIAGNOSIS — Z12.31 ENCOUNTER FOR SCREENING MAMMOGRAM FOR BREAST CANCER: ICD-10-CM

## 2023-08-14 DIAGNOSIS — E03.9 HYPOTHYROIDISM (ACQUIRED): Primary | ICD-10-CM

## 2023-08-14 DIAGNOSIS — Z11.4 SCREENING FOR HIV (HUMAN IMMUNODEFICIENCY VIRUS): ICD-10-CM

## 2023-08-14 DIAGNOSIS — E78.2 MIXED HYPERLIPIDEMIA: ICD-10-CM

## 2023-08-14 DIAGNOSIS — G47.33 OBSTRUCTIVE SLEEP APNEA SYNDROME: ICD-10-CM

## 2023-08-14 DIAGNOSIS — Z11.59 NEED FOR HEPATITIS C SCREENING TEST: ICD-10-CM

## 2023-08-14 PROBLEM — Z01.419 ENCOUNTER FOR ANNUAL ROUTINE GYNECOLOGICAL EXAMINATION: Status: RESOLVED | Noted: 2018-10-31 | Resolved: 2023-08-14

## 2023-08-14 PROBLEM — Z00.00 ANNUAL PHYSICAL EXAM: Status: RESOLVED | Noted: 2018-10-31 | Resolved: 2023-08-14

## 2023-08-14 PROBLEM — Z12.4 SCREENING FOR CERVICAL CANCER: Status: RESOLVED | Noted: 2018-10-31 | Resolved: 2023-08-14

## 2023-08-14 PROBLEM — Z48.89 POSTOPERATIVE VISIT: Status: RESOLVED | Noted: 2019-02-04 | Resolved: 2023-08-14

## 2023-08-14 PROBLEM — Z87.410 HISTORY OF CERVICAL DYSPLASIA: Status: RESOLVED | Noted: 2018-11-01 | Resolved: 2023-08-14

## 2023-08-14 PROBLEM — B97.7 HPV IN FEMALE: Status: RESOLVED | Noted: 2018-10-31 | Resolved: 2023-08-14

## 2023-08-14 PROCEDURE — 99213 OFFICE O/P EST LOW 20 MIN: CPT | Performed by: INTERNAL MEDICINE

## 2023-08-14 RX ORDER — LEVOTHYROXINE SODIUM 0.05 MG/1
50 TABLET ORAL DAILY
Qty: 90 TABLET | Refills: 1 | Status: SHIPPED | OUTPATIENT
Start: 2023-08-14

## 2023-08-14 NOTE — PROGRESS NOTES
BMI Counseling: Body mass index is 29.52 kg/m². The BMI is above normal. Nutrition recommendations include encouraging healthy choices of fruits and vegetables, decreasing fast food intake, consuming healthier snacks, moderation in carbohydrate intake, increasing intake of lean protein and reducing intake of cholesterol. Exercise recommendations include exercising 3-5 times per week and strength training exercises. No pharmacotherapy was ordered. Rationale for BMI follow-up plan is due to patient being overweight or obese. Depression Screening and Follow-up Plan: Patient was screened for depression during today's encounter. They screened negative with a PHQ-2 score of 0. Assessment/Plan:       Problem List Items Addressed This Visit        Endocrine    Hypothyroidism (acquired) - Primary     TSH is at goal  Continue same dose  Recheck labs prior to next visit         Relevant Medications    levothyroxine 50 mcg tablet    Other Relevant Orders    TSH, 3rd generation    CBC and differential       Respiratory    Obstructive sleep apnea syndrome     Has appt with sleep specialist coming up            Other    Mixed hyperlipidemia     Diet controlled  Monitor yearly         Relevant Orders    Lipid panel    Comprehensive metabolic panel   Other Visit Diagnoses     BMI 29.0-29.9,adult        Encounter for screening mammogram for breast cancer        Relevant Orders    Mammo screening bilateral w 3d & cad    Need for hepatitis C screening test        Relevant Orders    Hepatitis C Antibody    Screening for HIV (human immunodeficiency virus)        Relevant Orders    HIV 1/2 AG/AB w Reflex [facility] for 2 yr old and above            Subjective:     Chief Complaint   Patient presents with   • Follow-up     6 month follow up           Patient ID: Jaylyn Lynne is a 45 y.o. female who is here for follow up. She has been exercising for 20 min a day (walking on treadmill) and trying to watch diet.   Denies any issues with her thyroid medication. Denies worsening fatigue, weight gain, or changes in hair/nails. She is compliant with taking medication daily. Denies any recent illnesses. She has MAXINE but does not used CPAP regularly. She has an appt with a sleep specialist in September. Patient's past medical history, surgical history, family history, medications, allergies and social history reviewed and updated    Review of Systems   Constitutional: Negative for chills and fever. HENT: Negative for congestion, rhinorrhea and sore throat. Eyes: Negative for visual disturbance. Respiratory: Negative for cough and shortness of breath. Cardiovascular: Negative for chest pain and leg swelling. Gastrointestinal: Negative for abdominal distention, abdominal pain, blood in stool, constipation, diarrhea, nausea and vomiting. Endocrine: Negative for polyuria. Genitourinary: Negative for dysuria and frequency. Musculoskeletal: Negative for back pain. Skin: Negative for rash. Neurological: Negative for headaches. Psychiatric/Behavioral: Negative for dysphoric mood. All other systems reviewed and are negative. All other ROS negative. Objective:    Vitals:    08/14/23 0806   BP: 126/82   Pulse: 84   Temp: 99.2 °F (37.3 °C)   SpO2: 98%          Physical Exam  Vitals reviewed. Constitutional:       General: She is not in acute distress. HENT:      Right Ear: Tympanic membrane and external ear normal.      Left Ear: Tympanic membrane and external ear normal.      Nose: Nose normal.      Mouth/Throat:      Mouth: Mucous membranes are moist.   Eyes:      Conjunctiva/sclera: Conjunctivae normal.   Cardiovascular:      Rate and Rhythm: Normal rate and regular rhythm. Heart sounds: No murmur heard. Pulmonary:      Effort: Pulmonary effort is normal. No respiratory distress. Breath sounds: No wheezing. Abdominal:      General: There is no distension. Palpations: Abdomen is soft.       Tenderness: There is no abdominal tenderness. Musculoskeletal:      Right lower leg: No edema. Left lower leg: No edema. Lymphadenopathy:      Cervical: No cervical adenopathy. Neurological:      Mental Status: She is alert and oriented to person, place, and time.    Psychiatric:         Mood and Affect: Mood normal.             Kelli Flower MD  Internal Medicine and Pediatrics

## 2023-08-21 ENCOUNTER — ANESTHESIA EVENT (OUTPATIENT)
Dept: ANESTHESIOLOGY | Facility: AMBULATORY SURGERY CENTER | Age: 53
End: 2023-08-21

## 2023-08-21 ENCOUNTER — ANESTHESIA (OUTPATIENT)
Dept: ANESTHESIOLOGY | Facility: AMBULATORY SURGERY CENTER | Age: 53
End: 2023-08-21

## 2023-09-08 ENCOUNTER — TELEPHONE (OUTPATIENT)
Dept: GASTROENTEROLOGY | Facility: CLINIC | Age: 53
End: 2023-09-08

## 2023-09-08 NOTE — TELEPHONE ENCOUNTER
Spoke to pt confirming pt's colonoscopy scheduled  on 9/11/23 at [facility] with Dr. Brett Justice.  Pt has instructions and arrival time and did not have any questions.

## 2023-09-11 ENCOUNTER — ANESTHESIA EVENT (OUTPATIENT)
Dept: GASTROENTEROLOGY | Facility: AMBULATORY SURGERY CENTER | Age: 53
End: 2023-09-11

## 2023-09-11 ENCOUNTER — HOSPITAL ENCOUNTER (OUTPATIENT)
Dept: GASTROENTEROLOGY | Facility: AMBULATORY SURGERY CENTER | Age: 53
Discharge: HOME | End: 2023-09-11
Payer: COMMERCIAL

## 2023-09-11 ENCOUNTER — ANESTHESIA (OUTPATIENT)
Dept: GASTROENTEROLOGY | Facility: AMBULATORY SURGERY CENTER | Age: 53
End: 2023-09-11

## 2023-09-11 VITALS
DIASTOLIC BLOOD PRESSURE: 68 MMHG | RESPIRATION RATE: 18 BRPM | SYSTOLIC BLOOD PRESSURE: 124 MMHG | HEIGHT: 64 IN | WEIGHT: 173 LBS | HEART RATE: 68 BPM | BODY MASS INDEX: 29.53 KG/M2 | TEMPERATURE: 98.3 F | OXYGEN SATURATION: 98 %

## 2023-09-11 DIAGNOSIS — Z12.11 SCREENING FOR COLON CANCER: ICD-10-CM

## 2023-09-11 DIAGNOSIS — K57.32 DIVERTICULITIS OF SIGMOID COLON: Primary | ICD-10-CM

## 2023-09-11 PROBLEM — R92.8: Status: ACTIVE | Noted: 2017-11-05

## 2023-09-11 PROBLEM — N84.1 POLYP, CERVIX: Status: ACTIVE | Noted: 2017-11-08

## 2023-09-11 LAB
EXT PREGNANCY TEST URINE: NEGATIVE
EXT. CONTROL: NORMAL

## 2023-09-11 PROCEDURE — 45385 COLONOSCOPY W/LESION REMOVAL: CPT | Performed by: INTERNAL MEDICINE

## 2023-09-11 PROCEDURE — 45380 COLONOSCOPY AND BIOPSY: CPT | Performed by: INTERNAL MEDICINE

## 2023-09-11 PROCEDURE — 88342 IMHCHEM/IMCYTCHM 1ST ANTB: CPT | Performed by: PATHOLOGY

## 2023-09-11 PROCEDURE — 88305 TISSUE EXAM BY PATHOLOGIST: CPT | Performed by: PATHOLOGY

## 2023-09-11 RX ORDER — PROPOFOL 10 MG/ML
INJECTION, EMULSION INTRAVENOUS AS NEEDED
Status: DISCONTINUED | OUTPATIENT
Start: 2023-09-11 | End: 2023-09-11

## 2023-09-11 RX ORDER — SODIUM CHLORIDE, SODIUM LACTATE, POTASSIUM CHLORIDE, CALCIUM CHLORIDE 600; 310; 30; 20 MG/100ML; MG/100ML; MG/100ML; MG/100ML
INJECTION, SOLUTION INTRAVENOUS CONTINUOUS PRN
Status: DISCONTINUED | OUTPATIENT
Start: 2023-09-11 | End: 2023-09-11

## 2023-09-11 RX ORDER — LIDOCAINE HYDROCHLORIDE 10 MG/ML
INJECTION, SOLUTION EPIDURAL; INFILTRATION; INTRACAUDAL; PERINEURAL AS NEEDED
Status: DISCONTINUED | OUTPATIENT
Start: 2023-09-11 | End: 2023-09-11

## 2023-09-11 RX ORDER — CIPROFLOXACIN 500 MG/1
500 TABLET, FILM COATED ORAL EVERY 12 HOURS SCHEDULED
Qty: 20 TABLET | Refills: 0 | Status: SHIPPED | OUTPATIENT
Start: 2023-09-11 | End: 2023-09-21

## 2023-09-11 RX ORDER — METRONIDAZOLE 500 MG/1
500 TABLET ORAL EVERY 8 HOURS SCHEDULED
Qty: 30 TABLET | Refills: 0 | Status: SHIPPED | OUTPATIENT
Start: 2023-09-11 | End: 2023-09-21

## 2023-09-11 RX ADMIN — PROPOFOL 150 MG: 10 INJECTION, EMULSION INTRAVENOUS at 12:03

## 2023-09-11 RX ADMIN — PROPOFOL 50 MG: 10 INJECTION, EMULSION INTRAVENOUS at 12:16

## 2023-09-11 RX ADMIN — SODIUM CHLORIDE, SODIUM LACTATE, POTASSIUM CHLORIDE, CALCIUM CHLORIDE: 600; 310; 30; 20 INJECTION, SOLUTION INTRAVENOUS at 11:59

## 2023-09-11 RX ADMIN — LIDOCAINE HYDROCHLORIDE 50 MG: 10 INJECTION, SOLUTION EPIDURAL; INFILTRATION; INTRACAUDAL; PERINEURAL at 12:02

## 2023-09-11 RX ADMIN — PROPOFOL 50 MG: 10 INJECTION, EMULSION INTRAVENOUS at 12:15

## 2023-09-11 RX ADMIN — PROPOFOL 50 MG: 10 INJECTION, EMULSION INTRAVENOUS at 12:10

## 2023-09-11 RX ADMIN — PROPOFOL 50 MG: 10 INJECTION, EMULSION INTRAVENOUS at 12:06

## 2023-09-11 NOTE — ANESTHESIA POSTPROCEDURE EVALUATION
Post-Op Assessment Note    CV Status:  Stable    Pain management: adequate     Mental Status:  Sleepy   Hydration Status:  Euvolemic   PONV Controlled:  Controlled   Airway Patency:  Patent      Post Op Vitals Reviewed: Yes      Staff: Anesthesiologist         There were no known notable events for this encounter.     BP   108/62   Temp      Pulse  75   Resp   18   SpO2   95%

## 2023-09-11 NOTE — H&P
History and Physical -  [facility]  Shelbi Persaud 45 y.o. female MRN: [id number]                  HPI: Shelbi Persaud is a 45y.o. year old female who presents for first screening      REVIEW OF SYSTEMS: Per the HPI, and otherwise unremarkable.     Historical Information   Past Medical History:   Diagnosis Date   • Allergic rhinitis    • CPAP (continuous positive airway pressure) dependence    • Hyperlipidemia    • Hypothyroidism    • Sleep apnea     CPAP     Past Surgical History:   Procedure Laterality Date   • BREAST BIOPSY Right     Benign   • KNEE SURGERY     • MAMMO (HISTORICAL)  11/02/2017    10/31/2016, 10/29/2015     Social History   Social History     Substance and Sexual Activity   Alcohol Use Yes    Comment: Social     Social History     Substance and Sexual Activity   Drug Use No     Social History     Tobacco Use   Smoking Status Never   Smokeless Tobacco Never     Family History   Problem Relation Age of Onset   • Diabetes Father    • Arthritis Mother    • No Known Problems Sister    • No Known Problems Daughter    • No Known Problems Maternal Grandmother    • No Known Problems Maternal Grandfather    • No Known Problems Paternal Grandmother    • Cancer Paternal Grandfather    • No Known Problems Sister    • No Known Problems Sister    • No Known Problems Sister    • No Known Problems Maternal Aunt    • No Known Problems Maternal Aunt    • No Known Problems Paternal Aunt        Meds/Allergies       Current Outpatient Medications:   •  B Complex Vitamins (B COMPLEX PO)  •  Calcium Carbonate-Vitamin D (CALCIUM-D PO)  •  Cholecalciferol (VITAMIN D-3 PO)  •  levothyroxine 50 mcg tablet  •  loratadine (CLARITIN) 10 mg tablet  •  fluticasone (FLONASE) 50 mcg/act nasal spray    No Known Allergies    Objective     /71   Pulse 89   Temp 98.3 °F (36.8 °C) (Temporal)   Resp 18   Ht 5' 4" (1.626 m)   Wt 78.5 kg (173 lb)   LMP 06/18/2023   SpO2 95%   BMI 29.70 kg/m²       PHYSICAL EXAM    Gen: NAD  Head: NCAT  CV: RRR  CHEST: Clear  ABD: soft, NT/ND  EXT: no edema      ASSESSMENT/PLAN:  This is a 45y.o. year old female here for colonoscopy, and she is stable and optimized for her procedure.

## 2023-09-11 NOTE — ANESTHESIA PREPROCEDURE EVALUATION
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 Additional Monitors:   Airway Plan:           Plan Factors-Exercise tolerance (METS): >4 METS. Chart reviewed. EKG reviewed. Imaging results reviewed. Existing labs reviewed. Patient summary reviewed. Induction- intravenous. Postoperative Plan-     Informed Consent- Anesthetic plan and risks discussed with patient. I personally reviewed this patient with the CRNA. Discussed and agreed on the Anesthesia Plan with the CRNA. Maia Mcghee

## 2023-09-17 ENCOUNTER — APPOINTMENT (OUTPATIENT)
Dept: LAB | Facility: CLINIC | Age: 53
End: 2023-09-17
Payer: COMMERCIAL

## 2023-09-17 DIAGNOSIS — K57.32 DIVERTICULITIS OF SIGMOID COLON: ICD-10-CM

## 2023-09-17 LAB
ANION GAP SERPL CALCULATED.3IONS-SCNC: 3 MMOL/L
BASOPHILS # BLD AUTO: 0.07 THOUSANDS/ÂΜL (ref 0–0.1)
BASOPHILS NFR BLD AUTO: 1 % (ref 0–1)
BUN SERPL-MCNC: 21 MG/DL (ref 5–25)
CALCIUM SERPL-MCNC: 8.5 MG/DL (ref 8.4–10.2)
CHLORIDE SERPL-SCNC: 103 MMOL/L (ref 96–108)
CO2 SERPL-SCNC: 30 MMOL/L (ref 21–32)
CREAT SERPL-MCNC: 0.83 MG/DL (ref 0.6–1.3)
EOSINOPHIL # BLD AUTO: 0.24 THOUSAND/ÂΜL (ref 0–0.61)
EOSINOPHIL NFR BLD AUTO: 4 % (ref 0–6)
ERYTHROCYTE [DISTWIDTH] IN BLOOD BY AUTOMATED COUNT: 12.8 % (ref 11.6–15.1)
GFR SERPL CREATININE-BSD FRML MDRD: 80 ML/MIN/1.73SQ M
GLUCOSE P FAST SERPL-MCNC: 91 MG/DL (ref 65–99)
HCT VFR BLD AUTO: 39.4 % (ref 34.8–46.1)
HGB BLD-MCNC: 13.3 G/DL (ref 11.5–15.4)
IMM GRANULOCYTES # BLD AUTO: 0.01 THOUSAND/UL (ref 0–0.2)
IMM GRANULOCYTES NFR BLD AUTO: 0 % (ref 0–2)
LYMPHOCYTES # BLD AUTO: 1.78 THOUSANDS/ÂΜL (ref 0.6–4.47)
LYMPHOCYTES NFR BLD AUTO: 28 % (ref 14–44)
MCH RBC QN AUTO: 30.1 PG (ref 26.8–34.3)
MCHC RBC AUTO-ENTMCNC: 33.8 G/DL (ref 31.4–37.4)
MCV RBC AUTO: 89 FL (ref 82–98)
MONOCYTES # BLD AUTO: 0.5 THOUSAND/ÂΜL (ref 0.17–1.22)
MONOCYTES NFR BLD AUTO: 8 % (ref 4–12)
NEUTROPHILS # BLD AUTO: 3.74 THOUSANDS/ÂΜL (ref 1.85–7.62)
NEUTS SEG NFR BLD AUTO: 59 % (ref 43–75)
NRBC BLD AUTO-RTO: 0 /100 WBCS
PLATELET # BLD AUTO: 339 THOUSANDS/UL (ref 149–390)
PMV BLD AUTO: 9.2 FL (ref 8.9–12.7)
POTASSIUM SERPL-SCNC: 3.9 MMOL/L (ref 3.5–5.3)
RBC # BLD AUTO: 4.42 MILLION/UL (ref 3.81–5.12)
SODIUM SERPL-SCNC: 136 MMOL/L (ref 135–147)
WBC # BLD AUTO: 6.34 THOUSAND/UL (ref 4.31–10.16)

## 2023-09-17 PROCEDURE — 36415 COLL VENOUS BLD VENIPUNCTURE: CPT

## 2023-09-17 PROCEDURE — 85025 COMPLETE CBC W/AUTO DIFF WBC: CPT

## 2023-09-17 PROCEDURE — 80048 BASIC METABOLIC PNL TOTAL CA: CPT

## 2023-09-20 ENCOUNTER — HOSPITAL ENCOUNTER (OUTPATIENT)
Dept: CT IMAGING | Facility: HOSPITAL | Age: 53
Discharge: HOME | End: 2023-09-20
Attending: INTERNAL MEDICINE
Payer: COMMERCIAL

## 2023-09-20 ENCOUNTER — TELEPHONE (OUTPATIENT)
Age: 53
End: 2023-09-20

## 2023-09-20 DIAGNOSIS — K57.32 DIVERTICULITIS OF SIGMOID COLON: ICD-10-CM

## 2023-09-20 PROCEDURE — G1004 CDSM NDSC: HCPCS

## 2023-09-20 PROCEDURE — 74177 CT ABD & PELVIS W/CONTRAST: CPT

## 2023-09-20 RX ADMIN — IOHEXOL 100 ML: 350 INJECTION, SOLUTION INTRAVENOUS at 09:38

## 2023-09-26 ENCOUNTER — TELEPHONE (OUTPATIENT)
Age: 53
End: 2023-09-26

## 2023-09-26 NOTE — TELEPHONE ENCOUNTER
ECT    Treatment Number: 59  Procedure start time: 10/17/2018 9:37 AM  Timeout performed at: 10/17/2018 9:34 AM  Procedure end time: 10/17/2018 9:39 AM    Staffing  Anesthesiologist: Flaco Knowles Jr., MD  CRNA/Resident: Michael Willams MD  Performed by: resident/CRNA     Preanesthetic Checklist  The following were completed as part of the preanesthetic checklist: patient identified, procedural consent, pre-op evaluation, timeout performed, risks and benefits discussed, monitors and equipment checked, anesthesia consent given, oxygen available, suction available, hand hygiene performed and patient being monitored.    Setup & Induction  Patient Monitoring: heart rate, cardiac monitor, continuous pulse ox, continuous capnometry, NIBP and gas analyzer  Patient preparation: patient hyperventilated, mandibular stabilization, extremities padded and bite block inserted  Electrode Placement: Bitemporal    The patient was moved to the ECT therapy room after being assessed and consented for ECT. After standard ASA monitors were applied and timeout performed, the patient was adequately preoxygenated. After induction of general anesthesia, adequate oxygenation and ventilation were confirmed with pulse oxymetry and end tidal CO2 monitoring via bag-mask ventilation. End tidal CO2 was monitored throughout the case and moderate hyperventilation was performed prior to beginning ECT treatment. All extremities were padded, biteblock was inserted, and mandibular stabilization was done prior to initiating ECT therapy.    Procedure  Stimulus Number 1: 576 millicoulombs; Seizure Duration = 82 seconds            Recovery  Baseline Blood Pressure: 130/82  Peak Blood Pressure: 193/104  Time to Recovery of Respirations: 3 minutes    ECT Findings  ECT associated findings of: Moderate Hypertension (SBP >160 or DBP >100)               Patients GI provider:  Dr. Morrow Board    Number to return call:      Reason for call: received a call from radiology with significant findings on a ct scan I also sent a tiger text to MONIQUE Butler thank you     Scheduled procedure/appointment date if applicable: n/a

## 2023-09-27 ENCOUNTER — TELEPHONE (OUTPATIENT)
Dept: OBGYN CLINIC | Facility: CLINIC | Age: 53
End: 2023-09-27

## 2023-09-29 PROBLEM — R93.89 THICKENED ENDOMETRIUM: Status: ACTIVE | Noted: 2023-09-29

## 2023-09-29 PROBLEM — N83.202 CYST OF LEFT OVARY: Status: ACTIVE | Noted: 2023-09-29
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
## 2023-10-02 ENCOUNTER — OFFICE VISIT (OUTPATIENT)
Dept: OBGYN CLINIC | Facility: CLINIC | Age: 53
End: 2023-10-02
Payer: COMMERCIAL

## 2023-10-02 VITALS
BODY MASS INDEX: 29.23 KG/M2 | HEIGHT: 64 IN | SYSTOLIC BLOOD PRESSURE: 128 MMHG | DIASTOLIC BLOOD PRESSURE: 84 MMHG | WEIGHT: 171.2 LBS

## 2023-10-02 DIAGNOSIS — N83.202 CYST OF LEFT OVARY: Primary | ICD-10-CM

## 2023-10-02 DIAGNOSIS — R93.89 THICKENED ENDOMETRIUM: ICD-10-CM

## 2023-10-02 PROCEDURE — 99202 OFFICE O/P NEW SF 15 MIN: CPT | Performed by: OBSTETRICS & GYNECOLOGY
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
## 2023-10-16 ENCOUNTER — HOSPITAL ENCOUNTER (OUTPATIENT)
Dept: RADIOLOGY | Age: 53
Discharge: HOME | End: 2023-10-16
Payer: COMMERCIAL

## 2023-10-16 VITALS — HEIGHT: 64 IN | WEIGHT: 171 LBS | BODY MASS INDEX: 29.19 KG/M2

## 2023-10-16 DIAGNOSIS — Z12.31 ENCOUNTER FOR SCREENING MAMMOGRAM FOR BREAST CANCER: ICD-10-CM

## 2023-10-16 PROCEDURE — 77067 SCR MAMMO BI INCL CAD: CPT

## 2023-10-16 PROCEDURE — 77063 BREAST TOMOSYNTHESIS BI: CPT

## 2023-10-31 ENCOUNTER — HOSPITAL ENCOUNTER (OUTPATIENT)
Dept: ULTRASOUND IMAGING | Facility: CLINIC | Age: 53
Discharge: HOME | End: 2023-10-31
Payer: COMMERCIAL

## 2023-10-31 DIAGNOSIS — R92.8 ABNORMAL SCREENING MAMMOGRAM: ICD-10-CM

## 2023-10-31 PROCEDURE — 76642 ULTRASOUND BREAST LIMITED: CPT

## 2023-12-06 PROBLEM — Z12.31 ENCOUNTER FOR SCREENING MAMMOGRAM FOR BREAST CANCER: Status: ACTIVE | Noted: 2023-12-06

## 2023-12-06 PROBLEM — Z01.419 ENCOUNTER FOR ANNUAL ROUTINE GYNECOLOGICAL EXAMINATION: Status: ACTIVE | Noted: 2023-12-06
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
## 2023-12-07 ENCOUNTER — ANNUAL EXAM (OUTPATIENT)
Dept: OBGYN CLINIC | Facility: CLINIC | Age: 53
End: 2023-12-07
Payer: COMMERCIAL

## 2023-12-07 VITALS
BODY MASS INDEX: 29.64 KG/M2 | DIASTOLIC BLOOD PRESSURE: 86 MMHG | HEIGHT: 64 IN | WEIGHT: 173.6 LBS | SYSTOLIC BLOOD PRESSURE: 118 MMHG

## 2023-12-07 DIAGNOSIS — Z12.4 SCREENING FOR CERVICAL CANCER: ICD-10-CM

## 2023-12-07 DIAGNOSIS — Z01.419 ENCOUNTER FOR ANNUAL ROUTINE GYNECOLOGICAL EXAMINATION: Primary | ICD-10-CM

## 2023-12-07 DIAGNOSIS — N83.202 CYST OF LEFT OVARY: ICD-10-CM

## 2023-12-07 DIAGNOSIS — Z12.31 ENCOUNTER FOR SCREENING MAMMOGRAM FOR BREAST CANCER: ICD-10-CM

## 2023-12-07 PROCEDURE — G0145 SCR C/V CYTO,THINLAYER,RESCR: HCPCS | Performed by: OBSTETRICS & GYNECOLOGY

## 2023-12-07 PROCEDURE — G0476 HPV COMBO ASSAY CA SCREEN: HCPCS | Performed by: OBSTETRICS & GYNECOLOGY

## 2023-12-07 PROCEDURE — 99396 PREV VISIT EST AGE 40-64: CPT | Performed by: OBSTETRICS & GYNECOLOGY

## 2023-12-08 LAB
HPV HR 12 DNA CVX QL NAA+PROBE: NEGATIVE
HPV16 DNA CVX QL NAA+PROBE: NEGATIVE
HPV18 DNA CVX QL NAA+PROBE: NEGATIVE

## 2023-12-13 LAB
LAB AP GYN PRIMARY INTERPRETATION: NORMAL
Lab: NORMAL

## 2023-12-29 ENCOUNTER — OFFICE VISIT (OUTPATIENT)
Dept: LAB | Facility: CLINIC | Age: 53
End: 2023-12-29
Payer: COMMERCIAL

## 2023-12-29 DIAGNOSIS — Z01.818 PRE-OP TESTING: ICD-10-CM

## 2023-12-29 LAB
ATRIAL RATE: 78 BPM
P AXIS: 73 DEGREES
PR INTERVAL: 152 MS
QRS AXIS: 43 DEGREES
QRSD INTERVAL: 82 MS
QT INTERVAL: 380 MS
QTC INTERVAL: 433 MS
T WAVE AXIS: 57 DEGREES
VENTRICULAR RATE: 78 BPM

## 2023-12-29 PROCEDURE — 93005 ELECTROCARDIOGRAM TRACING: CPT

## 2024-02-04 PROBLEM — Z01.419 ENCOUNTER FOR ANNUAL ROUTINE GYNECOLOGICAL EXAMINATION: Status: RESOLVED | Noted: 2023-12-06 | Resolved: 2024-02-04

## 2024-02-27 ENCOUNTER — OFFICE VISIT (OUTPATIENT)
Dept: FAMILY MEDICINE CLINIC | Facility: CLINIC | Age: 54
End: 2024-02-27
Payer: COMMERCIAL

## 2024-02-27 VITALS
TEMPERATURE: 98 F | BODY MASS INDEX: 30.83 KG/M2 | OXYGEN SATURATION: 98 % | RESPIRATION RATE: 18 BRPM | HEART RATE: 98 BPM | SYSTOLIC BLOOD PRESSURE: 138 MMHG | WEIGHT: 180.6 LBS | DIASTOLIC BLOOD PRESSURE: 90 MMHG | HEIGHT: 64 IN

## 2024-02-27 DIAGNOSIS — Z00.00 ANNUAL PHYSICAL EXAM: Primary | ICD-10-CM

## 2024-02-27 DIAGNOSIS — E03.9 HYPOTHYROIDISM (ACQUIRED): ICD-10-CM

## 2024-02-27 DIAGNOSIS — G47.33 OBSTRUCTIVE SLEEP APNEA SYNDROME: ICD-10-CM

## 2024-02-27 PROBLEM — R92.8: Status: RESOLVED | Noted: 2017-11-05 | Resolved: 2024-02-27

## 2024-02-27 PROBLEM — Z12.31 ENCOUNTER FOR SCREENING MAMMOGRAM FOR BREAST CANCER: Status: RESOLVED | Noted: 2023-12-06 | Resolved: 2024-02-27

## 2024-02-27 PROBLEM — N87.0 MILD CERVICAL DYSPLASIA: Status: RESOLVED | Noted: 2019-01-02 | Resolved: 2024-02-27

## 2024-02-27 PROCEDURE — 99396 PREV VISIT EST AGE 40-64: CPT | Performed by: INTERNAL MEDICINE

## 2024-02-27 NOTE — PROGRESS NOTES
ADULT ANNUAL PHYSICAL  [facility] PRIMARY CARE    NAME: Alejandrina Charles  AGE: 53 y.o. SEX: female  : 1970     DATE: 2024     Assessment and Plan:     Problem List Items Addressed This Visit       Annual physical exam - Primary     Annual physical exam completed today. Discussed health maintenance topics including immunizations. Risk and benefits of relevant cancer screenings discussed and offered. Encouraged cessation of smoking if applicable. Encouraged healthy diet and exercise 3-5 times per week as tolerated. Reviewed prior labs and ordered labs if due. Repeat annual physical in 1 year.            Hypothyroidism (acquired)     Labs due- she will go this week  Continue same dose for now         Obstructive sleep apnea syndrome     Saw sleep specialist   They recommended Inspire            Immunizations and preventive care screenings were discussed with patient today. Appropriate education was printed on patient's after visit summary.    Counseling:  Alcohol/drug use: discussed moderation in alcohol intake, the recommendations for healthy alcohol use, and avoidance of illicit drug use.  Dental Health: discussed importance of regular tooth brushing, flossing, and dental visits.  Injury prevention: discussed safety/seat belts, safety helmets, smoke detectors, carbon dioxide detectors, and smoking near bedding or upholstery.  Exercise: the importance of regular exercise/physical activity was discussed. Recommend exercise 3-5 times per week for at least 30 minutes.          Return in about 4 months (around 2024) for Recheck.     Chief Complaint:     Chief Complaint   Patient presents with    Follow-up     6 month follow up       History of Present Illness:     Adult Annual Physical   Patient here for a comprehensive physical exam. The patient reports no problems.    Diet and Physical Activity  Diet/Nutrition:  getting better .   Exercise:  Starting to   .      Depression Screening  PHQ-2/9 Depression Screening    Little interest or pleasure in doing things: 0 - not at all  Feeling down, depressed, or hopeless: 0 - not at all  PHQ-2 Score: 0  PHQ-2 Interpretation: Negative depression screen       General Health  Sleep:  does not feel rested- going to get Inspire potentially - follows with sleep medicine .   Hearing:  No issues .  Vision: goes for regular eye exams.   Dental: regular dental visits.       /GYN Health  Follows with gynecology? yes   Patient is: postmenopausal       Review of Systems:     Review of Systems   Constitutional:  Negative for chills and fever.   HENT:  Negative for congestion and sore throat.    Respiratory:  Negative for cough and shortness of breath.    Cardiovascular:  Negative for chest pain and leg swelling.   Gastrointestinal:  Negative for abdominal pain, blood in stool and diarrhea.   Genitourinary:  Negative for dysuria and frequency.   Neurological:  Positive for headaches.   All other systems reviewed and are negative.     Past Medical History:     Past Medical History:   Diagnosis Date    Allergic rhinitis     CPAP (continuous positive airway pressure) dependence     Hyperlipidemia     Hypothyroidism     Sleep apnea     CPAP      Past Surgical History:     Past Surgical History:   Procedure Laterality Date    BREAST BIOPSY Right     Benign    KNEE SURGERY      MAMMO (HISTORICAL)  11/02/2017    10/31/2016, 10/29/2015      Social History:     Social History     Socioeconomic History    Marital status:      Spouse name: None    Number of children: 2    Years of education: None    Highest education level: None   Occupational History    None   Tobacco Use    Smoking status: Never    Smokeless tobacco: Never   Vaping Use    Vaping status: Never Used   Substance and Sexual Activity    Alcohol use: Yes     Comment: Social    Drug use: No    Sexual activity: Not Currently     Partners: Male   Other Topics Concern    None  "  Social History Narrative    Daily caffeine consumption, 2-3 serv/day     Exercises 1-2 times per week     Full-time employment      Social Determinants of Health     Financial Resource Strain: Not on file   Food Insecurity: Not on file   Transportation Needs: Not on file   Physical Activity: Not on file   Stress: Not on file   Social Connections: Not on file   Intimate Partner Violence: Not on file   Housing Stability: Not on file      Family History:     Family History   Problem Relation Age of Onset    Arthritis Mother     Diabetes Father     No Known Problems Sister     No Known Problems Sister     No Known Problems Sister     No Known Problems Sister     No Known Problems Daughter     No Known Problems Maternal Grandmother     No Known Problems Maternal Grandfather     No Known Problems Paternal Grandmother     Cancer Paternal Grandfather     No Known Problems Maternal Aunt     No Known Problems Maternal Aunt     No Known Problems Paternal Aunt     Breast cancer Neg Hx       Current Medications:     Current Outpatient Medications   Medication Sig Dispense Refill    B Complex Vitamins (B COMPLEX PO) Take by mouth      Calcium Carbonate-Vitamin D (CALCIUM-D PO) Take by mouth      Cholecalciferol (VITAMIN D-3 PO) Take by mouth      fluticasone (FLONASE) 50 mcg/act nasal spray fluticasone 50 mcg/actuation nasal spray,suspension      levothyroxine 50 mcg tablet Take 1 tablet (50 mcg total) by mouth daily 90 tablet 1    loratadine (CLARITIN) 10 mg tablet Take 10 mg by mouth Daily      Multiple Vitamin (MULTI-VITAMIN PO) Take by mouth       No current facility-administered medications for this visit.      Allergies:     No Known Allergies   Physical Exam:     /90 (BP Location: Left arm, Patient Position: Sitting, Cuff Size: Standard)   Pulse 98   Temp 98 °F (36.7 °C) (Tympanic)   Resp 18   Ht 5' 4\" (1.626 m)   Wt 81.9 kg (180 lb 9.6 oz)   SpO2 98%   BMI 31.00 kg/m²     Physical Exam  Vitals reviewed. "   Constitutional:       General: She is not in acute distress.  HENT:      Right Ear: Tympanic membrane and external ear normal.      Left Ear: Tympanic membrane and external ear normal.      Nose: Nose normal.      Mouth/Throat:      Mouth: Mucous membranes are moist.   Eyes:      Extraocular Movements: Extraocular movements intact.      Conjunctiva/sclera: Conjunctivae normal.      Pupils: Pupils are equal, round, and reactive to light.   Cardiovascular:      Rate and Rhythm: Normal rate and regular rhythm.      Heart sounds: No murmur heard.  Pulmonary:      Effort: Pulmonary effort is normal.      Breath sounds: Normal breath sounds. No wheezing.   Abdominal:      General: There is no distension.      Palpations: Abdomen is soft.      Tenderness: There is no abdominal tenderness.   Musculoskeletal:      Cervical back: Normal range of motion.      Right lower leg: No edema.      Left lower leg: No edema.   Lymphadenopathy:      Cervical: No cervical adenopathy.   Skin:     Findings: No rash.   Neurological:      Mental Status: She is alert and oriented to person, place, and time.      Cranial Nerves: No cranial nerve deficit.   Psychiatric:         Mood and Affect: Mood normal.         Behavior: Behavior normal.          CARA NIEVES MD  [facility] CARE

## 2024-03-11 DIAGNOSIS — E03.9 HYPOTHYROIDISM (ACQUIRED): ICD-10-CM

## 2024-03-11 RX ORDER — LEVOTHYROXINE SODIUM 0.05 MG/1
50 TABLET ORAL DAILY
Qty: 90 TABLET | Refills: 0 | Status: SHIPPED | OUTPATIENT
Start: 2024-03-11

## 2024-03-29 ENCOUNTER — HOSPITAL ENCOUNTER (OUTPATIENT)
Dept: ULTRASOUND IMAGING | Facility: HOSPITAL | Age: 54
End: 2024-03-29
Payer: COMMERCIAL

## 2024-03-29 DIAGNOSIS — N83.202 CYST OF LEFT OVARY: ICD-10-CM

## 2024-03-29 DIAGNOSIS — R93.89 THICKENED ENDOMETRIUM: ICD-10-CM

## 2024-03-29 PROCEDURE — 76856 US EXAM PELVIC COMPLETE: CPT

## 2024-04-06 ENCOUNTER — APPOINTMENT (OUTPATIENT)
Dept: LAB | Facility: CLINIC | Age: 54
End: 2024-04-06
Payer: COMMERCIAL

## 2024-04-06 DIAGNOSIS — Z11.59 NEED FOR HEPATITIS C SCREENING TEST: ICD-10-CM

## 2024-04-06 DIAGNOSIS — E78.2 MIXED HYPERLIPIDEMIA: ICD-10-CM

## 2024-04-06 DIAGNOSIS — Z11.4 SCREENING FOR HIV (HUMAN IMMUNODEFICIENCY VIRUS): ICD-10-CM

## 2024-04-06 DIAGNOSIS — G47.33 OBSTRUCTIVE SLEEP APNEA (ADULT) (PEDIATRIC): ICD-10-CM

## 2024-04-06 DIAGNOSIS — E03.9 HYPOTHYROIDISM (ACQUIRED): ICD-10-CM

## 2024-04-06 DIAGNOSIS — Z01.818 PRE-OPERATIVE EXAMINATION: ICD-10-CM

## 2024-04-06 LAB
ALBUMIN SERPL BCP-MCNC: 4.2 G/DL (ref 3.5–5)
ALP SERPL-CCNC: 51 U/L (ref 34–104)
ALT SERPL W P-5'-P-CCNC: 18 U/L (ref 7–52)
ANION GAP SERPL CALCULATED.3IONS-SCNC: 6 MMOL/L (ref 4–13)
AST SERPL W P-5'-P-CCNC: 17 U/L (ref 13–39)
BASOPHILS # BLD AUTO: 0.09 THOUSANDS/ÂΜL (ref 0–0.1)
BASOPHILS NFR BLD AUTO: 1 % (ref 0–1)
BILIRUB SERPL-MCNC: 0.32 MG/DL (ref 0.2–1)
BUN SERPL-MCNC: 17 MG/DL (ref 5–25)
CALCIUM SERPL-MCNC: 9.6 MG/DL (ref 8.4–10.2)
CHLORIDE SERPL-SCNC: 102 MMOL/L (ref 96–108)
CHOLEST SERPL-MCNC: 176 MG/DL
CO2 SERPL-SCNC: 31 MMOL/L (ref 21–32)
CREAT SERPL-MCNC: 0.85 MG/DL (ref 0.6–1.3)
EOSINOPHIL # BLD AUTO: 0.25 THOUSAND/ÂΜL (ref 0–0.61)
EOSINOPHIL NFR BLD AUTO: 4 % (ref 0–6)
ERYTHROCYTE [DISTWIDTH] IN BLOOD BY AUTOMATED COUNT: 12.6 % (ref 11.6–15.1)
GFR SERPL CREATININE-BSD FRML MDRD: 77 ML/MIN/1.73SQ M
GLUCOSE P FAST SERPL-MCNC: 92 MG/DL (ref 65–99)
HCT VFR BLD AUTO: 44.9 % (ref 34.8–46.1)
HDLC SERPL-MCNC: 41 MG/DL
HGB BLD-MCNC: 14.8 G/DL (ref 11.5–15.4)
IMM GRANULOCYTES # BLD AUTO: 0.01 THOUSAND/UL (ref 0–0.2)
IMM GRANULOCYTES NFR BLD AUTO: 0 % (ref 0–2)
LDLC SERPL CALC-MCNC: 105 MG/DL (ref 0–100)
LYMPHOCYTES # BLD AUTO: 1.82 THOUSANDS/ÂΜL (ref 0.6–4.47)
LYMPHOCYTES NFR BLD AUTO: 29 % (ref 14–44)
MCH RBC QN AUTO: 29.4 PG (ref 26.8–34.3)
MCHC RBC AUTO-ENTMCNC: 33 G/DL (ref 31.4–37.4)
MCV RBC AUTO: 89 FL (ref 82–98)
MONOCYTES # BLD AUTO: 0.42 THOUSAND/ÂΜL (ref 0.17–1.22)
MONOCYTES NFR BLD AUTO: 7 % (ref 4–12)
NEUTROPHILS # BLD AUTO: 3.68 THOUSANDS/ÂΜL (ref 1.85–7.62)
NEUTS SEG NFR BLD AUTO: 59 % (ref 43–75)
NONHDLC SERPL-MCNC: 135 MG/DL
NRBC BLD AUTO-RTO: 0 /100 WBCS
PLATELET # BLD AUTO: 337 THOUSANDS/UL (ref 149–390)
PMV BLD AUTO: 9.5 FL (ref 8.9–12.7)
POTASSIUM SERPL-SCNC: 4 MMOL/L (ref 3.5–5.3)
PROT SERPL-MCNC: 7.2 G/DL (ref 6.4–8.4)
RBC # BLD AUTO: 5.04 MILLION/UL (ref 3.81–5.12)
SODIUM SERPL-SCNC: 139 MMOL/L (ref 135–147)
TRIGL SERPL-MCNC: 151 MG/DL
TSH SERPL DL<=0.05 MIU/L-ACNC: 3.27 UIU/ML (ref 0.45–4.5)
WBC # BLD AUTO: 6.27 THOUSAND/UL (ref 4.31–10.16)

## 2024-04-06 PROCEDURE — 84443 ASSAY THYROID STIM HORMONE: CPT

## 2024-04-06 PROCEDURE — 80053 COMPREHEN METABOLIC PANEL: CPT

## 2024-04-06 PROCEDURE — 36415 COLL VENOUS BLD VENIPUNCTURE: CPT

## 2024-04-06 PROCEDURE — 80061 LIPID PANEL: CPT

## 2024-04-06 PROCEDURE — 87389 HIV-1 AG W/HIV-1&-2 AB AG IA: CPT

## 2024-04-06 PROCEDURE — 85025 COMPLETE CBC W/AUTO DIFF WBC: CPT

## 2024-04-06 PROCEDURE — 86803 HEPATITIS C AB TEST: CPT

## 2024-04-07 LAB
HCV AB SER QL: NORMAL
HIV 1+2 AB+HIV1 P24 AG SERPL QL IA: NORMAL
HIV 2 AB SERPL QL IA: NORMAL
HIV1 AB SERPL QL IA: NORMAL
HIV1 P24 AG SERPL QL IA: NORMAL

## 2024-04-08 ENCOUNTER — TELEPHONE (OUTPATIENT)
Dept: OBGYN CLINIC | Facility: CLINIC | Age: 54
End: 2024-04-08

## 2024-04-08 NOTE — TELEPHONE ENCOUNTER
----- Message from Titi Almonte MD sent at 4/5/2024  4:27 PM EDT -----  Results are normal  Please notify patient

## 2024-04-09 NOTE — PRE-PROCEDURE INSTRUCTIONS
Pre-Surgery Instructions:   Medication Instructions    B Complex Vitamins (B COMPLEX PO) Stop taking 7 days prior to surgery.    Calcium Carbonate-Vitamin D (CALCIUM-D PO) Stop taking 7 days prior to surgery.    Cholecalciferol (VITAMIN D-3 PO) Stop taking 7 days prior to surgery.    fluticasone (FLONASE) 50 mcg/act nasal spray Uses PRN- OK to take day of surgery    levothyroxine 50 mcg tablet Take day of surgery.    loratadine (CLARITIN) 10 mg tablet Hold day of surgery.    Multiple Vitamin (MULTI-VITAMIN PO) Stop taking 7 days prior to surgery.    Medication instructions for day surgery reviewed. Please use only a sip of water to take your instructed medications. Avoid aspirin and all over the counter vitamins, supplements and NSAIDS for one week prior to surgery per anesthesia guidelines. Tylenol is ok to take as needed.     You will receive a call one business day prior to surgery with an arrival time and hospital directions. If your surgery is scheduled on a Monday, the hospital will be calling you on the Friday prior to your surgery. If you have not heard from anyone by 8pm, please call the hospital supervisor through the hospital  at [phone]. ([place] [phone] or [place] [phone]).    Do not eat or drink anything after midnight the night before your surgery, including candy, mints, lifesavers, or chewing gum. Do not drink alcohol 24hrs before your surgery. Try not to smoke at least 24hrs before your surgery.       Follow the pre surgery showering instructions as listed in the “My Surgical Experience Booklet” or otherwise provided by your surgeon's office. Do not use a blade to shave the surgical area 1 week before surgery. It is okay to use a clean electric clippers up to 24 hours before surgery. Do not apply any lotions, creams, including makeup, cologne, deodorant, or perfumes after showering on the day of your surgery. Do not use dry shampoo, hair spray, hair gel, or any type of hair  products.     No contact lenses, eye make-up, or artificial eyelashes. Remove nail polish, including gel polish, and any artificial, gel, or acrylic nails if possible. Remove all jewelry including rings and body piercing jewelry.     Wear causal clothing that is easy to take on and off. Consider your type of surgery.    Keep any valuables, jewelry, piercings at home. Please bring any specially ordered equipment (sling, braces) if indicated.    Arrange for a responsible person to drive you to and from the hospital on the day of your surgery. Please confirm the visitor policy for the day of your procedure when you receive your phone call with an arrival time.     Call the surgeon's office with any new illnesses, exposures, or additional questions prior to surgery.    Please reference your “My Surgical Experience Booklet” for additional information to prepare for your upcoming surgery.

## 2024-04-17 ENCOUNTER — ANESTHESIA EVENT (OUTPATIENT)
Dept: PERIOP | Facility: HOSPITAL | Age: 54
End: 2024-04-17
Payer: COMMERCIAL

## 2024-04-17 NOTE — ANESTHESIA PREPROCEDURE EVALUATION
"Procedure:  INSERTION UPPER AIRWAY STIMULATOR (Head)    Relevant Problems   CARDIO   (+) Mixed hyperlipidemia      ENDO   (+) Hypothyroidism (acquired)      PULMONARY   (+) Obstructive sleep apnea syndrome        Physical Exam    Airway    Mallampati score: II  TM Distance: >3 FB  Neck ROM: full     Dental       Cardiovascular  Cardiovascular exam normal    Pulmonary  Pulmonary exam normal     Other Findings  post-pubertal.    Anesthesia Plan  ASA Score- 2     Anesthesia Type- general with ASA Monitors.         Additional Monitors:     Airway Plan: ETT.           Plan Factors-Exercise tolerance (METS): >4 METS.    Chart reviewed.   Existing labs reviewed. Patient summary reviewed.    Patient is not a current smoker. Patient not instructed to abstain from smoking on day of procedure. Patient did not smoke on day of surgery.            Induction- intravenous.    Postoperative Plan- Plan for postoperative opioid use.     Informed Consent- Anesthetic plan and risks discussed with patient.  I personally reviewed this patient with the CRNA. Discussed and agreed on the Anesthesia Plan with the CRNA..              No results found for: \"HGBA1C\"    Lab Results   Component Value Date    K 4.0 04/06/2024     04/06/2024    CO2 31 04/06/2024    BUN 17 04/06/2024    CREATININE 0.85 04/06/2024    GLUF 92 04/06/2024    CALCIUM 9.6 04/06/2024    AST 17 04/06/2024    ALT 18 04/06/2024    ALKPHOS 51 04/06/2024    EGFR 77 04/06/2024       Lab Results   Component Value Date    WBC 6.27 04/06/2024    HGB 14.8 04/06/2024    HCT 44.9 04/06/2024    MCV 89 04/06/2024     04/06/2024   Normal sinus rhythm  Possible Left atrial enlargement  No previous ECGs available  Confirmed by Noe Gilman ([id number]) on 12/29/2023 10:50:10 AM  "

## 2024-04-18 ENCOUNTER — ANESTHESIA (OUTPATIENT)
Dept: PERIOP | Facility: HOSPITAL | Age: 54
End: 2024-04-18
Payer: COMMERCIAL

## 2024-04-18 ENCOUNTER — HOSPITAL ENCOUNTER (OUTPATIENT)
Facility: HOSPITAL | Age: 54
Setting detail: OUTPATIENT SURGERY
Discharge: HOME | End: 2024-04-18
Attending: OTOLARYNGOLOGY | Admitting: OTOLARYNGOLOGY
Payer: COMMERCIAL

## 2024-04-18 ENCOUNTER — APPOINTMENT (OUTPATIENT)
Dept: RADIOLOGY | Facility: HOSPITAL | Age: 54
End: 2024-04-18
Payer: COMMERCIAL

## 2024-04-18 VITALS
SYSTOLIC BLOOD PRESSURE: 145 MMHG | DIASTOLIC BLOOD PRESSURE: 69 MMHG | OXYGEN SATURATION: 97 % | TEMPERATURE: 97.5 F | HEART RATE: 70 BPM | HEIGHT: 64 IN | WEIGHT: 180 LBS | BODY MASS INDEX: 30.73 KG/M2 | RESPIRATION RATE: 16 BRPM

## 2024-04-18 DIAGNOSIS — G47.33 OBSTRUCTIVE SLEEP APNEA SYNDROME: Primary | ICD-10-CM

## 2024-04-18 PROCEDURE — 72040 X-RAY EXAM NECK SPINE 2-3 VW: CPT

## 2024-04-18 PROCEDURE — C1787 PATIENT PROGR, NEUROSTIM: HCPCS | Performed by: OTOLARYNGOLOGY

## 2024-04-18 PROCEDURE — 71045 X-RAY EXAM CHEST 1 VIEW: CPT

## 2024-04-18 PROCEDURE — C1767 GENERATOR, NEURO NON-RECHARG: HCPCS | Performed by: OTOLARYNGOLOGY

## 2024-04-18 PROCEDURE — C1778 LEAD, NEUROSTIMULATOR: HCPCS | Performed by: OTOLARYNGOLOGY

## 2024-04-18 PROCEDURE — 64582 OPN MPLTJ HPGLSL NSTM ARY PG: CPT | Performed by: OTOLARYNGOLOGY

## 2024-04-18 DEVICE — THE INSPIRE® STIMULATION LEAD (MODEL 4063) IS DESIGNED TO DELIVER STIMULATION TO THE HYPOGLOSSAL NERVE FOR THE TREATMENT OF OBSTRUCTIVE SLEEP APNEA. THE LEAD FEATURES A FLEXIBLE, SELF-SIZING CUFF. ELECTRODES IN THE INNER SURFACE OF THE CUFF DELIVER STIMULATION TO THE NERVE. THE LEAD INCORPORATES A STANDARD CONNECTOR FOR COUPLING TO THE INSPIRE IMPLANTABLE PULSE GENERATOR (IPG).
Type: IMPLANTABLE DEVICE | Site: NECK | Status: FUNCTIONAL
Brand: INSPIRE

## 2024-04-18 DEVICE — THE MODEL 3028 IPG CONTAINS ELECTRONICS AND A BATTERY THAT ARE SEALED INSIDE A TITANIUM CASE.  THE IPG IS IMPLANTED SUBCUTANEOUSLY, BELOW THE CLAVICLE IN THE UPPER CHEST, AND CONNECT TO THE STIMULATION LEAD AND SENSING LEAD.  THE MODEL 3028 DOES NOT CONTAIN ANY SOFTWARE OR FIRMWARE.  ALL FUNCTIONS INCLUDING THE TELEMETRY AND ALGORITHM HAVE BEEN DESIGNED INTO THE HARDWARE OF THE IPG.  THE ALGORITHM SYNCHRONIZES STIMULATION OF THE HYPOGLOSSAL NERVE WITH RESPIRATION SIGNALS.   THE IPG PROCESSES THE SAME DYNAMIC RANGE OF PRESSURE SIGNALS (2-48 CMH2O) IN ORDER TO ACCOUNT FOR PRESSURE READING VARIABILITY.  BASED ON TYPICAL SETTINGS FROM THE STAR PIVOTAL TRIAL, THE LONGEVITY OF THE MODEL 3028’S BATTERY WILL AVERAGE 10 YEARS ALTHOUGH THE DEVICE IS SMALLER THAN THE CURRENTLY APPROVED VERSION (MODEL 3024).  IN ADDITION THE MODEL 3028 IPG WILL ALLOW PATIENTS TO SAFELY UNDERGO MAGNETIC RESONANCE IMAGING (MRI) UNDER SPECIFIED CONDITIONS.
Type: IMPLANTABLE DEVICE | Site: CHEST | Status: FUNCTIONAL
Brand: INSPIRE

## 2024-04-18 DEVICE — THE INSPIRE® RESPIRATORY SENSING LEAD (MODEL 4340) IS DESIGNED TO DETECT RESPIRATORY EFFORT. THE LEAD FEATURES A PRESSURE SENSITIVE MEMBRANE THAT CONVERTS THE MECHANICAL ENERGY OF RESPIRATION INTO AN ELECTRICAL SIGNAL. THE LEAD INCORPORATES A STANDARD CONNECTOR FOR COUPLING TO THE INSPIRE IMPLANTABLE PULSE GENERATOR (IPG) FOR TREATMENT OF OBSTRUCTIVE SLEEP APNEA.
Type: IMPLANTABLE DEVICE | Site: CHEST | Status: FUNCTIONAL
Brand: INSPIRE

## 2024-04-18 RX ORDER — SODIUM CHLORIDE, SODIUM LACTATE, POTASSIUM CHLORIDE, CALCIUM CHLORIDE 600; 310; 30; 20 MG/100ML; MG/100ML; MG/100ML; MG/100ML
INJECTION, SOLUTION INTRAVENOUS CONTINUOUS PRN
Status: DISCONTINUED | OUTPATIENT
Start: 2024-04-18 | End: 2024-04-18

## 2024-04-18 RX ORDER — SUCCINYLCHOLINE/SOD CL,ISO/PF 100 MG/5ML
SYRINGE (ML) INTRAVENOUS AS NEEDED
Status: DISCONTINUED | OUTPATIENT
Start: 2024-04-18 | End: 2024-04-18

## 2024-04-18 RX ORDER — HYDROMORPHONE HCL/PF 1 MG/ML
SYRINGE (ML) INJECTION AS NEEDED
Status: DISCONTINUED | OUTPATIENT
Start: 2024-04-18 | End: 2024-04-18

## 2024-04-18 RX ORDER — ONDANSETRON 2 MG/ML
4 INJECTION INTRAMUSCULAR; INTRAVENOUS ONCE AS NEEDED
Status: DISCONTINUED | OUTPATIENT
Start: 2024-04-18 | End: 2024-04-18 | Stop reason: HOSPADM

## 2024-04-18 RX ORDER — OXYCODONE HCL 5 MG/5 ML
5 SOLUTION, ORAL ORAL EVERY 4 HOURS PRN
Status: DISCONTINUED | OUTPATIENT
Start: 2024-04-18 | End: 2024-04-18 | Stop reason: HOSPADM

## 2024-04-18 RX ORDER — MIDAZOLAM HYDROCHLORIDE 2 MG/2ML
INJECTION, SOLUTION INTRAMUSCULAR; INTRAVENOUS AS NEEDED
Status: DISCONTINUED | OUTPATIENT
Start: 2024-04-18 | End: 2024-04-18

## 2024-04-18 RX ORDER — CEFAZOLIN SODIUM 1 G/3ML
INJECTION, POWDER, FOR SOLUTION INTRAMUSCULAR; INTRAVENOUS AS NEEDED
Status: DISCONTINUED | OUTPATIENT
Start: 2024-04-18 | End: 2024-04-18

## 2024-04-18 RX ORDER — ONDANSETRON 2 MG/ML
INJECTION INTRAMUSCULAR; INTRAVENOUS AS NEEDED
Status: DISCONTINUED | OUTPATIENT
Start: 2024-04-18 | End: 2024-04-18

## 2024-04-18 RX ORDER — MAGNESIUM HYDROXIDE 1200 MG/15ML
LIQUID ORAL AS NEEDED
Status: DISCONTINUED | OUTPATIENT
Start: 2024-04-18 | End: 2024-04-18 | Stop reason: HOSPADM

## 2024-04-18 RX ORDER — CEFAZOLIN SODIUM 2 G/50ML
2000 SOLUTION INTRAVENOUS ONCE
Status: DISCONTINUED | OUTPATIENT
Start: 2024-04-18 | End: 2024-04-18 | Stop reason: HOSPADM

## 2024-04-18 RX ORDER — FENTANYL CITRATE/PF 50 MCG/ML
50 SYRINGE (ML) INJECTION
Status: DISCONTINUED | OUTPATIENT
Start: 2024-04-18 | End: 2024-04-18 | Stop reason: HOSPADM

## 2024-04-18 RX ORDER — PROPOFOL 10 MG/ML
INJECTION, EMULSION INTRAVENOUS AS NEEDED
Status: DISCONTINUED | OUTPATIENT
Start: 2024-04-18 | End: 2024-04-18

## 2024-04-18 RX ORDER — PROPOFOL 10 MG/ML
INJECTION, EMULSION INTRAVENOUS CONTINUOUS PRN
Status: DISCONTINUED | OUTPATIENT
Start: 2024-04-18 | End: 2024-04-18

## 2024-04-18 RX ORDER — PHENYLEPHRINE HCL IN 0.9% NACL 1 MG/10 ML
SYRINGE (ML) INTRAVENOUS AS NEEDED
Status: DISCONTINUED | OUTPATIENT
Start: 2024-04-18 | End: 2024-04-18

## 2024-04-18 RX ORDER — DEXAMETHASONE SODIUM PHOSPHATE 10 MG/ML
INJECTION, SOLUTION INTRAMUSCULAR; INTRAVENOUS AS NEEDED
Status: DISCONTINUED | OUTPATIENT
Start: 2024-04-18 | End: 2024-04-18

## 2024-04-18 RX ORDER — LIDOCAINE HYDROCHLORIDE AND EPINEPHRINE 10; 10 MG/ML; UG/ML
INJECTION, SOLUTION INFILTRATION; PERINEURAL AS NEEDED
Status: DISCONTINUED | OUTPATIENT
Start: 2024-04-18 | End: 2024-04-18 | Stop reason: HOSPADM

## 2024-04-18 RX ORDER — SODIUM CHLORIDE, SODIUM LACTATE, POTASSIUM CHLORIDE, CALCIUM CHLORIDE 600; 310; 30; 20 MG/100ML; MG/100ML; MG/100ML; MG/100ML
20 INJECTION, SOLUTION INTRAVENOUS CONTINUOUS
Status: DISCONTINUED | OUTPATIENT
Start: 2024-04-18 | End: 2024-04-18 | Stop reason: HOSPADM

## 2024-04-18 RX ORDER — OXYCODONE HYDROCHLORIDE 5 MG/1
5 TABLET ORAL EVERY 4 HOURS PRN
Qty: 10 TABLET | Refills: 0 | Status: SHIPPED | OUTPATIENT
Start: 2024-04-18 | End: 2024-04-26

## 2024-04-18 RX ORDER — LIDOCAINE HYDROCHLORIDE 10 MG/ML
INJECTION, SOLUTION EPIDURAL; INFILTRATION; INTRACAUDAL; PERINEURAL AS NEEDED
Status: DISCONTINUED | OUTPATIENT
Start: 2024-04-18 | End: 2024-04-18

## 2024-04-18 RX ORDER — FENTANYL CITRATE 50 UG/ML
INJECTION, SOLUTION INTRAMUSCULAR; INTRAVENOUS AS NEEDED
Status: DISCONTINUED | OUTPATIENT
Start: 2024-04-18 | End: 2024-04-18

## 2024-04-18 RX ORDER — ACETAMINOPHEN 325 MG/1
650 TABLET ORAL EVERY 6 HOURS PRN
Status: DISCONTINUED | OUTPATIENT
Start: 2024-04-18 | End: 2024-04-18 | Stop reason: HOSPADM

## 2024-04-18 RX ORDER — HYDROMORPHONE HCL IN WATER/PF 6 MG/30 ML
0.2 PATIENT CONTROLLED ANALGESIA SYRINGE INTRAVENOUS
Status: DISCONTINUED | OUTPATIENT
Start: 2024-04-18 | End: 2024-04-18 | Stop reason: HOSPADM

## 2024-04-18 RX ORDER — GLYCOPYRROLATE 0.2 MG/ML
INJECTION INTRAMUSCULAR; INTRAVENOUS AS NEEDED
Status: DISCONTINUED | OUTPATIENT
Start: 2024-04-18 | End: 2024-04-18

## 2024-04-18 RX ORDER — SODIUM CHLORIDE, SODIUM LACTATE, POTASSIUM CHLORIDE, CALCIUM CHLORIDE 600; 310; 30; 20 MG/100ML; MG/100ML; MG/100ML; MG/100ML
50 INJECTION, SOLUTION INTRAVENOUS CONTINUOUS
Status: DISCONTINUED | OUTPATIENT
Start: 2024-04-18 | End: 2024-04-18 | Stop reason: HOSPADM

## 2024-04-18 RX ADMIN — ACETAMINOPHEN 650 MG: 325 TABLET, FILM COATED ORAL at 15:55

## 2024-04-18 RX ADMIN — DEXAMETHASONE SODIUM PHOSPHATE 10 MG: 10 INJECTION, SOLUTION INTRAMUSCULAR; INTRAVENOUS at 13:43

## 2024-04-18 RX ADMIN — PHENYLEPHRINE HYDROCHLORIDE 30 MCG/MIN: 50 INJECTION INTRAVENOUS at 13:54

## 2024-04-18 RX ADMIN — FENTANYL CITRATE 50 MCG: 50 INJECTION INTRAMUSCULAR; INTRAVENOUS at 14:44

## 2024-04-18 RX ADMIN — MIDAZOLAM 2 MG: 1 INJECTION INTRAMUSCULAR; INTRAVENOUS at 13:37

## 2024-04-18 RX ADMIN — Medication 100 MCG: at 14:04

## 2024-04-18 RX ADMIN — PROPOFOL 200 MG: 10 INJECTION, EMULSION INTRAVENOUS at 13:43

## 2024-04-18 RX ADMIN — GLYCOPYRROLATE 0.1 MG: 0.2 INJECTION INTRAMUSCULAR; INTRAVENOUS at 13:37

## 2024-04-18 RX ADMIN — PROPOFOL 90 MCG/KG/MIN: 10 INJECTION, EMULSION INTRAVENOUS at 13:44

## 2024-04-18 RX ADMIN — Medication 100 MG: at 13:43

## 2024-04-18 RX ADMIN — PROPOFOL 60 MCG/KG/MIN: 10 INJECTION, EMULSION INTRAVENOUS at 13:53

## 2024-04-18 RX ADMIN — SODIUM CHLORIDE, SODIUM LACTATE, POTASSIUM CHLORIDE, AND CALCIUM CHLORIDE: .6; .31; .03; .02 INJECTION, SOLUTION INTRAVENOUS at 13:23

## 2024-04-18 RX ADMIN — CEFAZOLIN 2000 MG: 1 INJECTION, POWDER, FOR SOLUTION INTRAMUSCULAR; INTRAVENOUS at 13:53

## 2024-04-18 RX ADMIN — Medication 100 MCG: at 13:57

## 2024-04-18 RX ADMIN — REMIFENTANIL HYDROCHLORIDE 0.1 MCG/KG/MIN: 1 INJECTION, POWDER, LYOPHILIZED, FOR SOLUTION INTRAVENOUS at 13:44

## 2024-04-18 RX ADMIN — LIDOCAINE HYDROCHLORIDE 50 MG: 10 INJECTION, SOLUTION EPIDURAL; INFILTRATION; INTRACAUDAL; PERINEURAL at 13:43

## 2024-04-18 RX ADMIN — ONDANSETRON 4 MG: 2 INJECTION INTRAMUSCULAR; INTRAVENOUS at 13:43

## 2024-04-18 RX ADMIN — FENTANYL CITRATE 50 MCG: 50 INJECTION INTRAMUSCULAR; INTRAVENOUS at 13:43

## 2024-04-18 RX ADMIN — HYDROMORPHONE HYDROCHLORIDE 0.5 MG: 1 INJECTION, SOLUTION INTRAMUSCULAR; INTRAVENOUS; SUBCUTANEOUS at 13:55

## 2024-04-18 RX ADMIN — SODIUM CHLORIDE, SODIUM LACTATE, POTASSIUM CHLORIDE, AND CALCIUM CHLORIDE: .6; .31; .03; .02 INJECTION, SOLUTION INTRAVENOUS at 14:54

## 2024-04-18 NOTE — H&P
"Alejandrina Charles is a 53 y.o.female who presents for re-evaluation of MAXINE. HST performed on 9/22/23 demonstrated an AHI of 41.6. Has previously tried CPAP but has decreased usage over previous years.    Past Medical History:   Diagnosis Date    Allergic rhinitis     CPAP (continuous positive airway pressure) dependence     Hyperlipidemia     diet controlled, on no meds currently    Hypothyroidism     Sleep apnea     CPAP       BP (!) 173/73   Pulse 85   Temp 97.8 °F (36.6 °C) (Temporal)   Resp 18   Ht 5' 4\" (1.626 m)   Wt 81.6 kg (180 lb)   LMP 09/11/2023 (Exact Date)   SpO2 93%   BMI 30.90 kg/m²       Physical Exam   Constitutional: Oriented to person, place, and time. Well-developed and well-nourished, no apparent distress, non-toxic appearance. Cooperative, able to hear and answer questions without difficulty.    Voice: Normal voice quality.  Head: Normocephalic, atraumatic.  No scars, masses or lesions.  Face: Symmetric, no edema, no sinus tenderness.  Eyes: Vision grossly intact, extra-ocular movement intact.  Ears: External ear normal.  Bilateral tympanic membranes are intact with intact normal landmarks. No post-auricular erythema or tenderness.  Nose: Septum midline, nares clear.  Mucosa moist, turbinates well appearing.  No crusting, polyps or discharge evident.  Oral cavity: Dentition intact.  Mucosa moist, lips normal.  Tongue mobile, floor of mouth normal.  Hard palate unremarkable.  No masses or lesions.   Oropharynx: Uvula is midline, soft palate normal.  Unremarkable oropharyngeal inlet.  Tonsils unremarkable.  Posterior pharyngeal wall clear. No masses or lesions.  Salivary glands:  Parotid glands and submandibular glands symmetric, no enlargement or tenderness.  Neck: Normal laryngeal elevation with swallow.  Trachea midline.  No masses or lesions. No palpable adenopathy.  Thyroid: normal in size, unremarkable without tenderness or palpable nodules.  Pulmonary/Chest: Normal effort and rate. No " respiratory distress.   Musculoskeletal: Normal range of motion.   Neurological: Cranial nerves 2-12 intact.  Skin: Skin is warm and dry.   Psychiatric: Normal mood and affect.    A/P: Obstructive sleep apnea: We discussed the nature of obstructive sleep apnea.  We discussed the natural history of sleep apnea. We discussed options for management. We discussed non-surgical management including weight loss, mandibular advancement devices, and positive airway pressure therapy including various options. We discussed that she is not tolerating her CPAP and has at least moderate MAXINE with an AHI of 41 and BMI of 30, she would like to move forward with Inspire hypoglossal nerve stimulator. Risks, benefits, and alternatives were discussed. Will seek her insurance approval and have her return in follow up for any further discussion.

## 2024-04-18 NOTE — ANESTHESIA POSTPROCEDURE EVALUATION
Post-Op Assessment Note    CV Status:  Stable    Pain management: adequate       Mental Status:  Sleepy and lethargic   Hydration Status:  Stable   PONV Controlled:  None   Airway Patency:  Patent     Post Op Vitals Reviewed: Yes    No anethesia notable event occurred.    Staff: CRNA               BP   152/71   Temp      Pulse  88   Resp      SpO2   93

## 2024-04-18 NOTE — OP NOTE
OPERATIVE REPORT  PATIENT NAME: Alejandrina Charles    :  1970  MRN: [id number]  Pt Location: [facility] 01    SURGERY DATE: 2024    Surgeons and Role:     * Pro Salazar MD - Primary     * Antoinette Shetty PA-C - Assisting    Preop Diagnosis:  Obstructive sleep apnea (adult) (pediatric) [G47.33]    Post-Op Diagnosis Codes:     * Obstructive sleep apnea (adult) (pediatric) [G47.33]    Procedure(s):  INSERTION UPPER AIRWAY STIMULATOR    Specimen(s):  * No specimens in log *    Estimated Blood Loss:   Minimal    Drains:  * No LDAs found *    Anesthesia Type:   General    Operative Indications:  Obstructive sleep apnea (adult) (pediatric) [G47.33]  BMI 30    Operative Findings:  Good stimulation at 0.7 V no retraction at 0.4 V    Complications:   None    Procedure and Technique:  Indications for procedure: Alejandrina Charles is a 53 y.o. female with a history of Moderate to Severe obstructive sleep apnea, who is intolerant and unable to achieve benefit from positive pressure therapy. Patient has passed the clinical, polysomnographic, and endoscopic screening criteria and presents today for the implant, whom I have seen in consultation for the above-listed procedure. After discussion of risks, benefits, and alternatives the patient elected to undergo the procedure and informed consent was obtained.      Procedure in detail: The patient was brought back to the operating room laid in the supine position and general endotracheal anesthesia was administered.  The patient was positioned appropriately.  Appropriate time-out was taken and procedure, sidedness, and marking was confirmed.  8 mL of 1% lidocaine with 1:100,000 epinephrine was infiltrated into the marked areas. Prior to prepping and draping, electrodes were placed in the genioglossus and styloglossus muscle and connected to the NIM box for intraoperative nerve monitoring. The patient was prepped and draped in standard fashion.     Neuroplasty was  performed as follows: The lateral branches to retrusor muscles were identified, and tested intra-operatively using the NIM stimulator. The branches were identified and the inclusion branches were stimulated with both visual and neurostimulator confirmation. The branches were dissected in 360 degrees for 1.5 cm around the TV and C1 branches with care not to include the HG branches.      Insertion of an upper airway stimulator was performed as follows: The cuff electrode for the hypoglossal nerve stimulator was placed distally to these branches on the medial nerve branch to the genioglossus muscle. Diagnostic evaluation confirmed activation of the genioglossus nerve, resulting in genioglossal activation and tongue protrusion, confirmed visually.  The stimulation electrode was then looped under and secured to the digastric tendon on its lateral surface with the provided anchor.    Insertion of a thoracic sensor lead was performed as follows:  A second 5 cm incision was made in the right upper chest approximately 3 cm below the clavicle.  Dissection was carried down to the pectoralis muscle. An inferior pocket was created deep to the subcutaneous layer and superficial to the pectoralis muscle.   Dissection was carried down through pectoralis muscle using blunt dissection. The interspace between the 2nd and 3rd ribs were exposed. The external oblique muscles were identified, and bluntly dissected, and a tunnel was created between the external and internal intercostals in the 2nd intercostal space just on the superior aspect of the third rib. The pleural respiration sensor was placed into the pocket in the inferior aspect of the intercostal space.  The sensor was sutured to the fascia using the provided anchors to maintain the sensor facing the pleural space.   The stimulation lead was then tunneled in a subplatysmal plane and brought out into the sub-clavicular pocket.     Insertion of an upper airway stimulator was  continued as follows: Both the cuff electrode and the respiration sensing lead were connected to the implantable pulse generator.  Diagnostic evaluation was run, which confirmed a good respiration sensing signal as well as good tongue protrusion stimulation.      The implantable pulse generator was placed in the subclavicular pocket and secured loosely to the pectoralis fascia using 2-0 silk sutures.  All the wounds were thoroughly irrigated with irrigation.  The wounds were then closed in three layers with deep layers closed with 3-0 Vicryl and the skin closed with 4-0 Monocryl. Wound dressings were placed.     The patient was returned to the care of Anesthesia, extubated without difficulty, and taken to the recovery area in stable condition. All instruments and sponge counts were correct at the end of the procedure. I, Pro Salazar MD, was present for and performed all key elements of the procedure.     I was present for the entire procedure., A qualified resident physician was not available., and A physician assistant was required during the procedure for retraction, tissue handling, dissection and suturing.    Patient Disposition:  PACU  and extubated and stable        SIGNATURE: Pro Salazar MD  DATE: April 18, 2024  TIME: 1:45 PM

## 2024-04-18 NOTE — DISCHARGE INSTR - AVS FIRST PAGE
Pro Salazar M.D.  [facility] Hypoglossal [facility] Stimulator    Post-Operative Care  Office [phone]  Cell [phone]               At Home (in the days immediately following the procedure):   Try to sleep with your head elevated on 2-3 pillows   Iced packs placed over wound will help reduce swelling.  They should be used 20 minutes on/ 20 minutes off while awake for the first full day.  Crushed ice in ziplock bags or frozen peas or corn work well.    The dressings may be removed 1 day after surgery. There are small strips under the dressings. These should fall off on their own. If they do not, our office will remove them at follow up. If they fall off early, the wounds should be cleaned with a Q-tip soaked in hydrogen peroxide mixed 50/50 with water.  After removal apply antibiotic ointment (Bacitracin) or Vaseline to the external incisions 3-4 times per day.   Take your medicines as prescribed.  REMEMBER:  DO NOT DRIVE WHILE TAKING PAIN MEDICATIONS.   You should do neck rolls 10 times clockwise and 10 times counterclockwise directions for 1 week after surgery.    You may shower with luke-warm water only after the dressings are removed.    You may use ibuprofen (Motrin, Advil) and acetaminophen (Tyelnol) for pain control in addition to any prescribed pain medications.     You may get out of bed and go to the bathroom with assistance. Eat light, soft meals as tolerated, avoiding gas-stimulating foods.     Follow-up care:   Eat before coming to the office for post-operative visits.  Rest for the first week after the procedure, avoiding excessive physical activities, hard chewing, lifting objects over 8 lbs (about the weight of a phone book), or bending over. We request that you do not travel by plane for one week after surgery.     Follow-up visits:    At one week, any external sutures will be removed; you may drive yourself to this appointment (as long as you are no longer taking prescription/narcotic pain  medicines).  After dressing removal, make-up may be worn, avoiding the incision lines. Additional follow-up visits will be scheduled at this time.  Note that final results from the incisions may not be apparent until ONE YEAR after surgery.    Healing Care:   After surgery try not to roll onto the wound while asleep.  Clean the external skin gently but thoroughly with soap. The use of alcohol and tobacco products prolong swelling and healing and are best avoided for 2 weeks after surgery.   Do not expose your wound to sun for 4-6 weeks after surgery.  Use sunscreen (SPF 30 or higher) for 6 months after surgery if sun exposure is absolutely necessary.  Avoid any physical exercise that can cause over-heating or over-exertion for two weeks after the surgery.  Complete healing may take 12 months.  It is to your advantage to return for all postoperative visits so that long-term results may be evaluated.    Frequently Asked Questions:  When can I shower and shampoo my hair?   You may shower the day after your surgery, BUT KEEP ANY DRESSING DRY.  This may mean you wash your face/hair in the sink instead.  It is important that you do not use hot water, as this can increase the swelling.  Lukewarm water is best.      When will the swelling and bruising go away?   This usually takes 7-10 days or so, but may take less or more time, depending on the individual.    When can I take aspirin?   You should not take aspirin for 2 weeks prior to or after surgery. The same is true for vitamin E, ginko, garlic pills, and other “natural” supplements.    When can I take ibuprofen?      Non-steroidal anti-inflammatory drugs such as advil (ibuprofen), alleve (naproxen), or other similar may be used immediately after surgery as per the guidelines on the package.      When can I wear my glasses?   You will be able to wear contact lenses as soon as you feel comfortable.  You may wear glasses one to two weeks after surgery, depending on the  type of surgery you had.  In any case, you must be careful not to place any pressure on the wound.     When can I wear makeup?   Make-up can be applied after suture removal, but not directly on the incisions until 3 weeks after the procedure.    When will I activate my device?       We will wait for your healing to finish prior to activation which usually means a few weeks. The plan will be set up at your first follow up appointment at 1 week after the procedure.     What about exercise?   Please adhere to the following schedule:   Up to week 1 after surgery:  REST!  No strenuous exercise.  Walking is ok.  Week 1-3 after surgery:  You may begin light aerobic exercise, but no bending over/straining/lifting weights. You may begin some range of motion exercises of your shoulder.   Week 3+:  You may begin more strenuous exercise, such as yoga, stretching, bending over, lifting weights.  Please remember to start slowly.  Week 6+:  You may resume contact sports, such as soccer, basketball, etc    POST-OPERATIVE APPOINTMENTS:  1 week:  wound check in the office.   1 month: device activation and wound check in the office.  3 months: device titration sleep study at [facility].   4 months: final wound check in the office.   Yearly: device check at office.     How to contact us:    Phone:  If you have questions or concerns, please call us at [phone] during business hours (8 am to 5 pm).  On nights and weekends, you may page the ENT surgeon on call  at [facility].  In case of emergency, please call [phone].

## 2024-05-21 PROBLEM — Z45.42 ENCOUNTER FOR ADJUSTMENT AND MANAGEMENT OF NEUROSTIMULATOR: Status: ACTIVE | Noted: 2018-10-31

## 2024-05-21 PROBLEM — G47.9 SLEEP DISTURBANCES: Status: ACTIVE | Noted: 2024-05-21

## 2024-06-10 DIAGNOSIS — E03.9 HYPOTHYROIDISM (ACQUIRED): ICD-10-CM

## 2024-06-10 RX ORDER — LEVOTHYROXINE SODIUM 0.05 MG/1
50 TABLET ORAL DAILY
Qty: 90 TABLET | Refills: 1 | Status: SHIPPED | OUTPATIENT
Start: 2024-06-10

## 2024-06-21 ENCOUNTER — RA CDI HCC (OUTPATIENT)
Dept: OTHER | Facility: HOSPITAL | Age: 54
End: 2024-06-21

## 2024-07-02 ENCOUNTER — OFFICE VISIT (OUTPATIENT)
Age: 54
End: 2024-07-02
Payer: COMMERCIAL

## 2024-07-02 VITALS
HEIGHT: 64 IN | TEMPERATURE: 97.8 F | SYSTOLIC BLOOD PRESSURE: 126 MMHG | OXYGEN SATURATION: 98 % | BODY MASS INDEX: 31.07 KG/M2 | DIASTOLIC BLOOD PRESSURE: 84 MMHG | HEART RATE: 78 BPM | WEIGHT: 182 LBS | RESPIRATION RATE: 16 BRPM

## 2024-07-02 DIAGNOSIS — E78.2 MIXED HYPERLIPIDEMIA: ICD-10-CM

## 2024-07-02 DIAGNOSIS — E03.9 HYPOTHYROIDISM (ACQUIRED): ICD-10-CM

## 2024-07-02 DIAGNOSIS — G47.33 OSA (OBSTRUCTIVE SLEEP APNEA): Primary | ICD-10-CM

## 2024-07-02 PROCEDURE — 99213 OFFICE O/P EST LOW 20 MIN: CPT | Performed by: INTERNAL MEDICINE

## 2024-07-02 NOTE — ASSESSMENT & PLAN NOTE
TSH at goal in April 2024  Check yearly or sooner if any new symptoms  Continue same dose of levothyroxine

## 2024-07-02 NOTE — PROGRESS NOTES
Assessment/Plan:       Problem List Items Addressed This Visit       Hypothyroidism (acquired)     TSH at goal in April 2024  Check yearly or sooner if any new symptoms  Continue same dose of levothyroxine         Mixed hyperlipidemia     Diet controlled  Low ASCVD risk  Check lipids yearly         MAXINE (obstructive sleep apnea) - Primary     S/p Inspire placement  Feeling more rested when waking up  Following with sleep medicine              Subjective:     Chief Complaint   Patient presents with    Follow-up     6 month check up          Patient ID: Alejandrina Charles is a 53 y.o. female who is here for her 6-month follow-up.  She denies any issues today.  Since her last visit she did have surgery to get the Inspire put in.  She is really happy that she did this because it has helped her MAXINE symptoms.  She feels less tired and more rested during the day.  She is still slowly titrating up to the max setting and will have a repeat sleep study in September.    She is doing well on her thyroid medication.  Denies any changes in skin, hair, nails, bowel movements.    She is starting to do more exercise and watch her diet.     Patient's past medical history, surgical history, family history, medications, allergies and social history reviewed and updated    Review of Systems   Constitutional:  Negative for chills and fever.   HENT:  Negative for congestion and sore throat.    Respiratory:  Negative for cough and shortness of breath.    Cardiovascular:  Negative for chest pain and leg swelling.   Gastrointestinal:  Negative for abdominal pain, blood in stool and diarrhea.   Genitourinary:  Negative for dysuria and frequency.   Neurological:  Negative for headaches.         All other ROS negative.     Objective:    Vitals:    07/02/24 0829   BP: 126/84   Pulse: 78   Resp: 16   Temp: 97.8 °F (36.6 °C)   SpO2: 98%          Physical Exam  Vitals reviewed.   Constitutional:       General: She is not in acute distress.      "Appearance: She is obese.   HENT:      Right Ear: External ear normal.      Left Ear: External ear normal.      Nose: Nose normal.      Mouth/Throat:      Mouth: Mucous membranes are moist.   Eyes:      Conjunctiva/sclera: Conjunctivae normal.   Cardiovascular:      Rate and Rhythm: Normal rate and regular rhythm.      Heart sounds: No murmur heard.  Pulmonary:      Effort: Pulmonary effort is normal. No respiratory distress.      Breath sounds: No wheezing.   Abdominal:      General: There is no distension.      Palpations: Abdomen is soft.      Tenderness: There is no abdominal tenderness.   Musculoskeletal:      Right lower leg: No edema.      Left lower leg: No edema.   Lymphadenopathy:      Cervical: No cervical adenopathy.   Neurological:      Mental Status: She is alert and oriented to person, place, and time.   Psychiatric:         Mood and Affect: Mood normal.               Portions of the record may have been created with voice recognition software.  Occasional wrong word or \"sound a like\" substitutions may have occurred due to the inherent limitations of voice recognition software.  Read the chart carefully and recognize, using context, where substitutions have occurred.     Kristel Swift MD  Internal Medicine and Pediatrics  "

## 2024-07-09 ENCOUNTER — TELEPHONE (OUTPATIENT)
Dept: GASTROENTEROLOGY | Facility: CLINIC | Age: 54
End: 2024-07-09

## 2024-07-09 NOTE — TELEPHONE ENCOUNTER
Pt is due for a colonoscopy with Dr Pride for hx of sessile polyps, hx of ta polyp. I lmom for pt to please call back to schedule. Will call again if do not hear back from her.

## 2024-08-01 ENCOUNTER — TELEPHONE (OUTPATIENT)
Age: 54
End: 2024-08-01

## 2024-08-01 ENCOUNTER — PREP FOR PROCEDURE (OUTPATIENT)
Age: 54
End: 2024-08-01

## 2024-08-01 DIAGNOSIS — Z86.010 PERSONAL HISTORY OF COLONIC POLYPS: Primary | ICD-10-CM

## 2024-08-01 NOTE — TELEPHONE ENCOUNTER
Scheduled date of colonoscopy (as of today): 11/01/2024  Physician performing colonoscopy: PASCUAL  Location of colonoscopy: [facility]  Bowel prep reviewed with patient: JASMIN/MINDY  Instructions reviewed with patient by: YAZMIN  Clearances: N/A  : EDUARDO

## 2024-09-26 ENCOUNTER — HOSPITAL ENCOUNTER (OUTPATIENT)
Dept: SLEEP CENTER | Facility: CLINIC | Age: 54
Discharge: HOME | End: 2024-09-26
Payer: COMMERCIAL

## 2024-09-26 DIAGNOSIS — G47.33 OSA (OBSTRUCTIVE SLEEP APNEA): ICD-10-CM

## 2024-09-26 PROCEDURE — 95810 POLYSOM 6/> YRS 4/> PARAM: CPT

## 2024-09-26 PROCEDURE — 95977 ALYS CPLX CN NPGT PRGRMG: CPT

## 2024-09-27 NOTE — PROGRESS NOTES
Sleep Study Documentation    Pre-Sleep Study       Sleep testing procedure explained to patient:YES    Patient napped prior to study:NO    Caffeine:Dayshift worker after 12PM.  Caffeine use:NO    Alcohol:Dayshift workers after 5PM: Alcohol use:NO    Typical day for patient:YES       Study Documentation    Sleep Study Indications: Witnessed apneas    Sleep Study: Treatment   Optimal : 1.9V    Snore:Not eliminated  REM Obtained:no  Supplemental O2: no    Minimum SaO2 71  Baseline SaO2 97    Minimum SaO2 at final 88  Mode of Therapy: INSPIRE      EKG abnormalities: no     EEG abnormalities: no    Were abnormal behaviors in sleep observed:NO    Is Total Sleep Study Recording Time < 2 hours: N/A    Is Total Sleep Study Recording Time > 2 hours but study is incomplete: N/A    Is Total Sleep Study Recording Time 6 hours or more but sleep was not obtained: NO    Patient classification: employed       Post-Sleep Study    Medication used at bedtime or during sleep study:NO    Patient reports time it took to fall asleep:less than 20 minutes    Patient reports waking up during study:3 or more times.  Patient reports returning to sleep in 10 to 30 minutes.    Patient reports sleeping 4 to 6 hours with dreaming.    Does the Patient feel this is a typical night of sleep:typical    Patient rated sleepiness: Somewhat sleepy or tired    PAP treatment:no.

## 2024-10-28 ENCOUNTER — TELEPHONE (OUTPATIENT)
Age: 54
End: 2024-10-28

## 2024-10-28 DIAGNOSIS — Z12.39 ENCOUNTER FOR BREAST CANCER SCREENING USING NON-MAMMOGRAM MODALITY: Primary | ICD-10-CM

## 2024-10-28 NOTE — TELEPHONE ENCOUNTER
Called patient and made aware of provider recommendations. Patient states she was hoping to get breast imaging done before end of this year. States it will be over one year if she waits until appointment. Last mammogram was 10/2023. Patient has upcoming visit with provider Jan 2025. Routing to provider for review.

## 2024-10-28 NOTE — TELEPHONE ENCOUNTER
Called patient and made aware of order placed. Patient appreciative. Call warm transferred to central scheduling for assistance in scheduling.

## 2024-10-28 NOTE — TELEPHONE ENCOUNTER
Pt called in, previously seen by Dr. Almonte but will be seeing Dr. Berry moving forward. States she is due for her mammogram, however she has the Inspire device. Her sleep doctor does not recommend having a mammogram at this time due to the pressure. Pt is wondering what her alternative could be. Advised will review. Pt thankful.

## 2024-11-01 ENCOUNTER — ANESTHESIA EVENT (OUTPATIENT)
Dept: GASTROENTEROLOGY | Facility: HOSPITAL | Age: 54
End: 2024-11-01
Payer: COMMERCIAL

## 2024-11-01 ENCOUNTER — ANESTHESIA (OUTPATIENT)
Dept: GASTROENTEROLOGY | Facility: HOSPITAL | Age: 54
End: 2024-11-01
Payer: COMMERCIAL

## 2024-11-01 ENCOUNTER — HOSPITAL ENCOUNTER (OUTPATIENT)
Dept: GASTROENTEROLOGY | Facility: HOSPITAL | Age: 54
Setting detail: OUTPATIENT SURGERY
End: 2024-11-01
Attending: INTERNAL MEDICINE
Payer: COMMERCIAL

## 2024-11-01 VITALS
DIASTOLIC BLOOD PRESSURE: 62 MMHG | WEIGHT: 181.4 LBS | BODY MASS INDEX: 30.97 KG/M2 | HEIGHT: 64 IN | TEMPERATURE: 97.6 F | RESPIRATION RATE: 14 BRPM | HEART RATE: 76 BPM | SYSTOLIC BLOOD PRESSURE: 120 MMHG | OXYGEN SATURATION: 98 %

## 2024-11-01 DIAGNOSIS — Z86.0100 HISTORY OF COLONIC POLYPS: ICD-10-CM

## 2024-11-01 PROCEDURE — 45385 COLONOSCOPY W/LESION REMOVAL: CPT | Performed by: INTERNAL MEDICINE

## 2024-11-01 PROCEDURE — 88305 TISSUE EXAM BY PATHOLOGIST: CPT | Performed by: PATHOLOGY

## 2024-11-01 RX ORDER — PROPOFOL 10 MG/ML
INJECTION, EMULSION INTRAVENOUS CONTINUOUS PRN
Status: DISCONTINUED | OUTPATIENT
Start: 2024-11-01 | End: 2024-11-01

## 2024-11-01 RX ORDER — SODIUM CHLORIDE, SODIUM LACTATE, POTASSIUM CHLORIDE, CALCIUM CHLORIDE 600; 310; 30; 20 MG/100ML; MG/100ML; MG/100ML; MG/100ML
INJECTION, SOLUTION INTRAVENOUS CONTINUOUS PRN
Status: DISCONTINUED | OUTPATIENT
Start: 2024-11-01 | End: 2024-11-01

## 2024-11-01 RX ORDER — PROPOFOL 10 MG/ML
INJECTION, EMULSION INTRAVENOUS AS NEEDED
Status: DISCONTINUED | OUTPATIENT
Start: 2024-11-01 | End: 2024-11-01

## 2024-11-01 RX ADMIN — PROPOFOL 100 MG: 10 INJECTION, EMULSION INTRAVENOUS at 11:07

## 2024-11-01 RX ADMIN — SODIUM CHLORIDE, SODIUM LACTATE, POTASSIUM CHLORIDE, AND CALCIUM CHLORIDE: .6; .31; .03; .02 INJECTION, SOLUTION INTRAVENOUS at 10:53

## 2024-11-01 RX ADMIN — PROPOFOL 100 MCG/KG/MIN: 10 INJECTION, EMULSION INTRAVENOUS at 11:07

## 2024-11-01 NOTE — ANESTHESIA POSTPROCEDURE EVALUATION
Post-Op Assessment Note    CV Status:  Stable    Pain management: adequate       Mental Status:  Alert and awake   Hydration Status:  Euvolemic   PONV Controlled:  Controlled   Airway Patency:  Patent     Post Op Vitals Reviewed: Yes    No anethesia notable event occurred.    Staff: CRNA           Last Filed PACU Vitals:  Vitals Value Taken Time   Temp     Pulse 78    /76    Resp 18    SpO2 97 RA        Modified Chucho:  Activity: 2 (11/1/2024 10:05 AM)  Respiration: 2 (11/1/2024 10:05 AM)  Circulation: 2 (11/1/2024 10:05 AM)  Consciousness: 2 (11/1/2024 10:05 AM)  Oxygen Saturation: 2 (11/1/2024 10:05 AM)  Modified Chucho Score: 10 (11/1/2024 10:05 AM)

## 2024-11-01 NOTE — H&P
History and Physical - [facility]  Alejandrina Charles 54 y.o. female MRN: [id number]                  HPI: Alejandrina Charles is a 54 y.o. year old female who presents for history of polyps      REVIEW OF SYSTEMS: Per the HPI, and otherwise unremarkable.    Historical Information   Past Medical History:   Diagnosis Date    Allergic rhinitis     CPAP (continuous positive airway pressure) dependence     Hyperlipidemia     diet controlled, on no meds currently    Hypothyroidism     Sleep apnea     CPAP     Past Surgical History:   Procedure Laterality Date    BREAST BIOPSY Right     Benign    COLONOSCOPY      EGD      KNEE SURGERY      MAMMO (HISTORICAL)  11/02/2017    10/31/2016, 10/29/2015    MT OPEN IMPLTJ HPGLSL NRV NSTIM RA PG&RESPIR SENSOR N/A 4/18/2024    Procedure: INSERTION UPPER AIRWAY STIMULATOR;  Surgeon: Pro Salazar MD;  Location: [facility];  Service: ENT     Social History   Social History     Substance and Sexual Activity   Alcohol Use Yes    Comment: Social - not even weekly     Social History     Substance and Sexual Activity   Drug Use No     Social History     Tobacco Use   Smoking Status Never    Passive exposure: Never   Smokeless Tobacco Never     Family History   Problem Relation Age of Onset    Arthritis Mother     Diabetes Father     No Known Problems Sister     No Known Problems Sister     No Known Problems Sister     No Known Problems Sister     No Known Problems Daughter     No Known Problems Maternal Grandmother     No Known Problems Maternal Grandfather     No Known Problems Paternal Grandmother     Cancer Paternal Grandfather     No Known Problems Maternal Aunt     No Known Problems Maternal Aunt     No Known Problems Paternal Aunt     Breast cancer Neg Hx        Meds/Allergies       Current Outpatient Medications:     Cholecalciferol (VITAMIN D-3 PO)    levothyroxine 50 mcg tablet    loratadine (CLARITIN) 10 mg tablet    B Complex Vitamins (B COMPLEX PO)    Calcium  "Carbonate-Vitamin D (CALCIUM-D PO)    fluticasone (FLONASE) 50 mcg/act nasal spray    Multiple Vitamin (MULTI-VITAMIN PO)    traZODone (DESYREL) 50 mg tablet    No Known Allergies    Objective     /76   Pulse 78   Temp 97.5 °F (36.4 °C) (Temporal)   Resp 18   Ht 5' 4\" (1.626 m)   Wt 82.3 kg (181 lb 6.4 oz)   LMP 09/11/2023 (Exact Date)   SpO2 95%   BMI 31.14 kg/m²       PHYSICAL EXAM    Gen: NAD  Head: NCAT  CV: RRR  CHEST: Clear  ABD: soft, NT/ND  EXT: no edema      ASSESSMENT/PLAN:  This is a 54 y.o. year old female here for colonoscopy, and she is stable and optimized for her procedure.        "

## 2024-11-01 NOTE — ANESTHESIA PREPROCEDURE EVALUATION
Procedure:  COLONOSCOPY    Relevant Problems   CARDIO   (+) Mixed hyperlipidemia      ENDO   (+) Hypothyroidism (acquired)      PULMONARY   (+) MAXINE (obstructive sleep apnea)        Physical Exam    Airway    Mallampati score: II  TM Distance: >3 FB  Neck ROM: full     Dental   No notable dental hx     Cardiovascular  Rhythm: regular, Rate: normal, Cardiovascular exam normal    Pulmonary  Pulmonary exam normal Breath sounds clear to auscultation    Other Findings  post-pubertal.      Anesthesia Plan  ASA Score- 2     Anesthesia Type- IV sedation with anesthesia with ASA Monitors.         Additional Monitors:     Airway Plan:            Plan Factors-Exercise tolerance (METS): >4 METS.    Chart reviewed. EKG reviewed. Imaging results reviewed. Existing labs reviewed. Patient summary reviewed.    Patient is not a current smoker.  Patient did not smoke on day of surgery.            Induction- intravenous.    Postoperative Plan-     Perioperative Resuscitation Plan - Level 1 - Full Code.       Informed Consent- Anesthetic plan and risks discussed with patient.  I personally reviewed this patient with the CRNA. Discussed and agreed on the Anesthesia Plan with the CRNA..

## 2024-11-07 PROCEDURE — 88305 TISSUE EXAM BY PATHOLOGIST: CPT | Performed by: PATHOLOGY

## 2024-11-19 ENCOUNTER — HOSPITAL ENCOUNTER (OUTPATIENT)
Dept: RADIOLOGY | Facility: HOSPITAL | Age: 54
Discharge: HOME | End: 2024-11-19

## 2024-11-19 DIAGNOSIS — Z12.39 ENCOUNTER FOR BREAST CANCER SCREENING USING NON-MAMMOGRAM MODALITY: ICD-10-CM

## 2024-12-18 ENCOUNTER — ANNUAL EXAM (OUTPATIENT)
Dept: OBGYN CLINIC | Facility: CLINIC | Age: 54
End: 2024-12-18
Payer: COMMERCIAL

## 2024-12-18 VITALS
HEIGHT: 64 IN | DIASTOLIC BLOOD PRESSURE: 84 MMHG | BODY MASS INDEX: 31.76 KG/M2 | WEIGHT: 186 LBS | SYSTOLIC BLOOD PRESSURE: 128 MMHG

## 2024-12-18 DIAGNOSIS — Z01.419 ENCNTR FOR GYN EXAM (GENERAL) (ROUTINE) W/O ABN FINDINGS: Primary | ICD-10-CM

## 2024-12-18 DIAGNOSIS — N92.6 IRREGULAR MENSES: ICD-10-CM

## 2024-12-18 DIAGNOSIS — Z12.31 ENCOUNTER FOR SCREENING MAMMOGRAM FOR MALIGNANT NEOPLASM OF BREAST: ICD-10-CM

## 2024-12-18 DIAGNOSIS — R87.613 PAP SMEAR ABNORMALITY OF CERVIX WITH HGSIL: ICD-10-CM

## 2024-12-18 DIAGNOSIS — G47.33 OSA (OBSTRUCTIVE SLEEP APNEA): ICD-10-CM

## 2024-12-18 PROCEDURE — 99396 PREV VISIT EST AGE 40-64: CPT | Performed by: STUDENT IN AN ORGANIZED HEALTH CARE EDUCATION/TRAINING PROGRAM

## 2024-12-18 NOTE — ASSESSMENT & PLAN NOTE
-has inspire device placed by otolaryngology. Message sent to office to ensure safe breast screening. Both mammogram and ABUS is safe with inspire device present. Will call to schedule both mammogram and ABUS.

## 2024-12-18 NOTE — PROGRESS NOTES
Name: Alejandrina Charles      : 1970      MRN: [id number]  Encounter Provider: Sharda Berry DO  Encounter Date: 2024   Encounter department: [facility] [place]  :  Assessment & Plan  Encntr for gyn exam (general) (routine) w/o abn findings  -mammogram: 10/16/23 BIRADs 1 in right, 0 in left, 10/31/23 breast US BIRADs 2  -colonoscopy: 10/31/24 repeat in 5 years   -LMP: 24  -not sexually active, denies dyspareunia  -STD testing: denies  -smoking: denies  -drinks alcohol socially  -recommend 30 min of exercise daily   -denies family history of ovarian, breast, colon cancer  -return in one year or earlier if needed          Encounter for screening mammogram for malignant neoplasm of breast         Irregular menses  -reports bleeding every 3-4 months lasting for 7 days. Can sometimes be heavy, and other times light. Unsure when mother went into menopause. 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 Message sent to office to ensure safe breast screening. Both mammogram and ABUS is safe with inspire device present. Will call to schedule both mammogram and ABUS.            History of Present Illness   HPI  Alejandrina Charles is a 54 y.o. female  LMP 24 perimenopausal presents for annual exam.       Review of Systems   Constitutional:  Negative for appetite change, chills, fatigue and fever.   Respiratory:  Negative for cough, chest tightness, shortness of breath and wheezing.    Cardiovascular:  Negative for chest pain,  "palpitations and leg swelling.   Gastrointestinal:  Negative for abdominal distention, abdominal pain, constipation, diarrhea, nausea and vomiting.   Endocrine: Negative for cold intolerance, heat intolerance and polyuria.   Genitourinary:  Positive for menstrual problem. Negative for difficulty urinating, dyspareunia, dysuria, genital sores, vaginal bleeding, vaginal discharge and vaginal pain.   Neurological:  Negative for dizziness, weakness, light-headedness and headaches.          Objective   /84   Ht 5' 4.17\" (1.63 m)   Wt 84.4 kg (186 lb)   LMP 11/01/2024 (Approximate)   BMI 31.75 kg/m²      Physical Exam  Constitutional:       General: She is not in acute distress.     Appearance: Normal appearance. She is not ill-appearing.   Cardiovascular:      Rate and Rhythm: Normal rate.   Pulmonary:      Effort: Pulmonary effort is normal. No respiratory distress.   Chest:   Breasts:     Breasts are symmetrical.      Right: Normal. No swelling, bleeding, inverted nipple, mass, nipple discharge, skin change or tenderness.      Left: Normal. No swelling, bleeding, inverted nipple, mass, nipple discharge, skin change or tenderness.          Comments: Inspire device palpated above right breast   Abdominal:      General: Abdomen is flat. There is no distension.      Palpations: Abdomen is soft.      Tenderness: There is no abdominal tenderness. There is no guarding or rebound.   Genitourinary:     General: Normal vulva.      Exam position: Lithotomy position.      Labia:         Right: No rash, tenderness, lesion or injury.         Left: No rash, tenderness, lesion or injury.       Urethra: No prolapse, urethral pain, urethral swelling or urethral lesion.      Vagina: Normal. No foreign body. No vaginal discharge, erythema, tenderness, bleeding or lesions.      Cervix: Normal. No cervical motion tenderness, discharge, friability, lesion, erythema, cervical bleeding or eversion.      Uterus: Normal. Not enlarged, " not fixed, not tender and no uterine prolapse.       Adnexa: Right adnexa normal and left adnexa normal.        Right: No mass, tenderness or fullness.          Left: No mass, tenderness or fullness.     Musculoskeletal:      Right lower leg: No edema.      Left lower leg: No edema.   Lymphadenopathy:      Upper Body:      Right upper body: No supraclavicular, axillary or pectoral adenopathy.      Left upper body: No supraclavicular, axillary or pectoral adenopathy.   Neurological:      General: No focal deficit present.      Mental Status: She is alert and oriented to person, place, and time.   Psychiatric:         Mood and Affect: Mood normal.         Behavior: Behavior normal.         Thought Content: Thought content normal.         Judgment: Judgment normal.

## 2024-12-18 NOTE — ASSESSMENT & PLAN NOTE
-mammogram: 10/16/23 BIRADs 1 in right, 0 in left, 10/31/23 breast US BIRADs 2  -colonoscopy: 10/31/24 repeat in 5 years   -LMP: 11/1/24  -not sexually active, denies dyspareunia  -STD testing: denies  -smoking: denies  -drinks alcohol socially  -recommend 30 min of exercise daily   -denies family history of ovarian, breast, colon cancer  -return in one year or earlier if needed

## 2024-12-18 NOTE — ASSESSMENT & PLAN NOTE
-reports bleeding every 3-4 months lasting for 7 days. Can sometimes be heavy, and other times light. Unsure when mother went into menopause. 51yo sister is perimenopausal   -CBC, TSH, FSH, Pelvic US ordered   -will likely need EMB pending workup    Orders:    CBC and differential; Future    TSH, 3rd generation with Free T4 reflex; Future    Follicle stimulating hormone; Future    US pelvis complete w transvaginal; Future

## 2024-12-19 ENCOUNTER — APPOINTMENT (OUTPATIENT)
Dept: LAB | Facility: CLINIC | Age: 54
End: 2024-12-19
Payer: COMMERCIAL

## 2024-12-19 ENCOUNTER — HOSPITAL ENCOUNTER (OUTPATIENT)
Dept: ULTRASOUND IMAGING | Facility: HOSPITAL | Age: 54
Discharge: HOME | End: 2024-12-19
Attending: STUDENT IN AN ORGANIZED HEALTH CARE EDUCATION/TRAINING PROGRAM
Payer: COMMERCIAL

## 2024-12-19 DIAGNOSIS — N92.6 IRREGULAR MENSES: ICD-10-CM

## 2024-12-19 LAB
BASOPHILS # BLD AUTO: 0.09 THOUSANDS/ΜL (ref 0–0.1)
BASOPHILS NFR BLD AUTO: 1 % (ref 0–1)
EOSINOPHIL # BLD AUTO: 0.28 THOUSAND/ΜL (ref 0–0.61)
EOSINOPHIL NFR BLD AUTO: 4 % (ref 0–6)
ERYTHROCYTE [DISTWIDTH] IN BLOOD BY AUTOMATED COUNT: 12.8 % (ref 11.6–15.1)
FSH SERPL-ACNC: 37.5 MIU/ML
HCT VFR BLD AUTO: 40.8 % (ref 34.8–46.1)
HGB BLD-MCNC: 13.7 G/DL (ref 11.5–15.4)
IMM GRANULOCYTES # BLD AUTO: 0.02 THOUSAND/UL (ref 0–0.2)
IMM GRANULOCYTES NFR BLD AUTO: 0 % (ref 0–2)
LYMPHOCYTES # BLD AUTO: 2.44 THOUSANDS/ΜL (ref 0.6–4.47)
LYMPHOCYTES NFR BLD AUTO: 32 % (ref 14–44)
MCH RBC QN AUTO: 29.7 PG (ref 26.8–34.3)
MCHC RBC AUTO-ENTMCNC: 33.6 G/DL (ref 31.4–37.4)
MCV RBC AUTO: 88 FL (ref 82–98)
MONOCYTES # BLD AUTO: 0.59 THOUSAND/ΜL (ref 0.17–1.22)
MONOCYTES NFR BLD AUTO: 8 % (ref 4–12)
NEUTROPHILS # BLD AUTO: 4.27 THOUSANDS/ΜL (ref 1.85–7.62)
NEUTS SEG NFR BLD AUTO: 55 % (ref 43–75)
NRBC BLD AUTO-RTO: 0 /100 WBCS
PLATELET # BLD AUTO: 342 THOUSANDS/UL (ref 149–390)
PMV BLD AUTO: 9.3 FL (ref 8.9–12.7)
RBC # BLD AUTO: 4.62 MILLION/UL (ref 3.81–5.12)
T4 FREE SERPL-MCNC: 0.7 NG/DL (ref 0.61–1.12)
TSH SERPL DL<=0.05 MIU/L-ACNC: 5.32 UIU/ML (ref 0.45–4.5)
WBC # BLD AUTO: 7.69 THOUSAND/UL (ref 4.31–10.16)

## 2024-12-19 PROCEDURE — 36415 COLL VENOUS BLD VENIPUNCTURE: CPT

## 2024-12-19 PROCEDURE — 76856 US EXAM PELVIC COMPLETE: CPT

## 2024-12-19 PROCEDURE — 85025 COMPLETE CBC W/AUTO DIFF WBC: CPT

## 2024-12-19 PROCEDURE — 83001 ASSAY OF GONADOTROPIN (FSH): CPT

## 2024-12-19 PROCEDURE — 84439 ASSAY OF FREE THYROXINE: CPT

## 2024-12-19 PROCEDURE — 84443 ASSAY THYROID STIM HORMONE: CPT

## 2024-12-19 PROCEDURE — 76830 TRANSVAGINAL US NON-OB: CPT

## 2024-12-20 ENCOUNTER — RESULTS FOLLOW-UP (OUTPATIENT)
Dept: OBGYN CLINIC | Facility: CLINIC | Age: 54
End: 2024-12-20

## 2024-12-23 ENCOUNTER — TELEPHONE (OUTPATIENT)
Age: 54
End: 2024-12-23

## 2024-12-23 DIAGNOSIS — E03.9 HYPOTHYROIDISM (ACQUIRED): Primary | ICD-10-CM

## 2024-12-23 NOTE — TELEPHONE ENCOUNTER
FYI Patient returned VM.  Read note verbatim.  Patient states she did see her TSH level is high and is going to call her PCP.

## 2024-12-23 NOTE — TELEPHONE ENCOUNTER
Pt called in stating that her gyno had her complete blood work and her TSH levels are elevated. Pt would like PCP to advise. She is currently on levothyroxine 50 mcg daily.

## 2024-12-23 NOTE — TELEPHONE ENCOUNTER
Please ensure patient is taking levothyroxine on an empty stomach in the morning and giving 1 hour between thyroid medication and other medications/breakfast.  Repeat thyroid labs ordered, to be done for upcoming visit in January.

## 2025-01-03 ENCOUNTER — APPOINTMENT (OUTPATIENT)
Dept: LAB | Facility: CLINIC | Age: 55
End: 2025-01-03
Payer: COMMERCIAL

## 2025-01-03 DIAGNOSIS — E03.9 HYPOTHYROIDISM (ACQUIRED): ICD-10-CM

## 2025-01-03 LAB
T4 FREE SERPL-MCNC: 0.67 NG/DL (ref 0.61–1.12)
TSH SERPL DL<=0.05 MIU/L-ACNC: 3.64 UIU/ML (ref 0.45–4.5)

## 2025-01-03 PROCEDURE — 36415 COLL VENOUS BLD VENIPUNCTURE: CPT

## 2025-01-03 PROCEDURE — 84443 ASSAY THYROID STIM HORMONE: CPT

## 2025-01-03 PROCEDURE — 84439 ASSAY OF FREE THYROXINE: CPT

## 2025-01-07 ENCOUNTER — OFFICE VISIT (OUTPATIENT)
Age: 55
End: 2025-01-07
Payer: COMMERCIAL

## 2025-01-07 VITALS
RESPIRATION RATE: 18 BRPM | WEIGHT: 185.6 LBS | HEART RATE: 83 BPM | BODY MASS INDEX: 31.69 KG/M2 | DIASTOLIC BLOOD PRESSURE: 80 MMHG | SYSTOLIC BLOOD PRESSURE: 132 MMHG | HEIGHT: 64 IN | TEMPERATURE: 97.8 F | OXYGEN SATURATION: 97 %

## 2025-01-07 DIAGNOSIS — G47.33 OSA (OBSTRUCTIVE SLEEP APNEA): ICD-10-CM

## 2025-01-07 DIAGNOSIS — Z12.31 ENCOUNTER FOR SCREENING MAMMOGRAM FOR BREAST CANCER: ICD-10-CM

## 2025-01-07 DIAGNOSIS — E03.9 HYPOTHYROIDISM (ACQUIRED): ICD-10-CM

## 2025-01-07 DIAGNOSIS — E78.2 MIXED HYPERLIPIDEMIA: ICD-10-CM

## 2025-01-07 DIAGNOSIS — E78.5 DYSLIPIDEMIA: Primary | ICD-10-CM

## 2025-01-07 PROCEDURE — 99214 OFFICE O/P EST MOD 30 MIN: CPT | Performed by: INTERNAL MEDICINE

## 2025-01-07 NOTE — ASSESSMENT & PLAN NOTE
Stable  Unable to tolerate CPAP and was recently initiated on inspire with good benefits.    Orders:    Comprehensive metabolic panel; Future

## 2025-01-07 NOTE — ASSESSMENT & PLAN NOTE
TSH and free T4 stable  Continue current doses of levothyroxine  Reevaluated next office visit  Orders:    Comprehensive metabolic panel; Future

## 2025-01-07 NOTE — ASSESSMENT & PLAN NOTE
Discussed dietary modifications and regular physical activity  Evaluate in the next office visit.

## 2025-01-07 NOTE — PROGRESS NOTES
Name: Alejandrina Charles      : 1970      MRN: [id number]  Encounter Provider: Tigre Red MD  Encounter Date: 2025   Encounter department: [facility]  :  Assessment & Plan  Encounter for screening mammogram for breast cancer    Orders:    Mammo screening bilateral w 3d and cad; Future    Comprehensive metabolic panel; Future    Dyslipidemia  Diet controlled  Check FLP  Orders:    Comprehensive metabolic panel; Future    Lipid panel; Future    Hypothyroidism (acquired)  TSH and free T4 stable  Continue current doses of levothyroxine  Reevaluated next office visit  Orders:    Comprehensive metabolic panel; Future    MAXINE (obstructive sleep apnea)  Stable  Unable to tolerate CPAP and was recently initiated on inspire with good benefits.    Orders:    Comprehensive metabolic panel; Future    Mixed hyperlipidemia    Orders:    Comprehensive metabolic panel; Future    BMI 30.0-30.9,adult  Discussed dietary modifications and regular physical activity  Evaluate in the next office visit.              History of Present Illness     Patient comes for follow-up of multiple chronic medical conditions.  She denies subjective complaints today      Review of Systems   Constitutional:  Negative for appetite change, chills, diaphoresis, fatigue, fever and unexpected weight change.   Respiratory:  Negative for apnea, cough, choking, chest tightness, shortness of breath, wheezing and stridor.    Cardiovascular:  Negative for chest pain, palpitations and leg swelling.   Gastrointestinal:  Negative for abdominal distention, abdominal pain, anal bleeding, blood in stool, constipation, diarrhea, nausea and vomiting.   Genitourinary:  Negative for decreased urine volume, difficulty urinating, frequency and urgency.   Musculoskeletal:  Negative for arthralgias, back pain and myalgias.   Neurological:  Negative for dizziness, light-headedness, numbness and headaches.         Past Medical History    Past Medical History:   Diagnosis Date    Allergic rhinitis     CPAP (continuous positive airway pressure) dependence     Disease of thyroid gland 2003    Hyperlipidemia     diet controlled, on no meds currently    Hypothyroidism     Sleep apnea     CPAP     Past Surgical History:   Procedure Laterality Date    BREAST BIOPSY Right     Benign    COLONOSCOPY      EGD      KNEE SURGERY      MAMMO (HISTORICAL)  11/02/2017    10/31/2016, 10/29/2015    SC OPEN IMPLTJ HPGLSL NRV NSTIM RA PG&RESPIR SENSOR N/A 4/18/2024    Procedure: INSERTION UPPER AIRWAY STIMULATOR;  Surgeon: Pro Salazar MD;  Location: [facility];  Service: ENT     Family History   Problem Relation Age of Onset    Arthritis Mother     Diabetes Father     No Known Problems Sister     No Known Problems Sister     No Known Problems Sister     No Known Problems Sister     No Known Problems Daughter     No Known Problems Maternal Grandmother     No Known Problems Maternal Grandfather     No Known Problems Paternal Grandmother     Cancer Paternal Grandfather     No Known Problems Maternal Aunt     No Known Problems Maternal Aunt     No Known Problems Paternal Aunt     Breast cancer Neg Hx       reports that she has never smoked. She has never been exposed to tobacco smoke. She has never used smokeless tobacco. She reports current alcohol use. She reports that she does not use drugs.  Current Outpatient Medications on File Prior to Visit   Medication Sig Dispense Refill    B Complex Vitamins (B COMPLEX PO) Take by mouth      Calcium Carbonate-Vitamin D (CALCIUM-D PO) Take by mouth      Cholecalciferol (VITAMIN D-3 PO) Take by mouth      fluticasone (FLONASE) 50 mcg/act nasal spray if needed      levothyroxine 50 mcg tablet TAKE 1 TABLET BY MOUTH EVERY DAY 90 tablet 1    loratadine (CLARITIN) 10 mg tablet Take 10 mg by mouth Daily      Multiple Vitamin (MULTI-VITAMIN PO) Take by mouth       No current facility-administered medications on file prior to  "visit.   No Known Allergies   Objective   /80 (BP Location: Left arm, Patient Position: Sitting, Cuff Size: Standard)   Pulse 83   Temp 97.8 °F (36.6 °C) (Temporal)   Resp 18   Ht 5' 4.17\" (1.63 m) Comment: per other visits  Wt 84.2 kg (185 lb 9.6 oz)   LMP 11/01/2024 (Approximate)   SpO2 97%   BMI 31.69 kg/m²      Physical Exam  Constitutional:       General: She is not in acute distress.     Appearance: Normal appearance. She is normal weight. She is not ill-appearing, toxic-appearing or diaphoretic.   Cardiovascular:      Rate and Rhythm: Normal rate and regular rhythm.      Pulses: Normal pulses.      Heart sounds: Normal heart sounds. No murmur heard.     No gallop.   Pulmonary:      Effort: Pulmonary effort is normal. No respiratory distress.      Breath sounds: Normal breath sounds. No stridor. No wheezing, rhonchi or rales.   Chest:      Chest wall: No tenderness.   Abdominal:      General: There is no distension.      Palpations: Abdomen is soft.      Tenderness: There is no abdominal tenderness. There is no guarding.   Musculoskeletal:      Right lower leg: No edema.      Left lower leg: No edema.   Neurological:      Mental Status: She is alert and oriented to person, place, and time.         "

## 2025-01-17 PROBLEM — Z01.419 ENCNTR FOR GYN EXAM (GENERAL) (ROUTINE) W/O ABN FINDINGS: Status: RESOLVED | Noted: 2024-12-18 | Resolved: 2025-01-17

## 2025-01-22 DIAGNOSIS — E03.9 HYPOTHYROIDISM (ACQUIRED): ICD-10-CM

## 2025-01-22 RX ORDER — LEVOTHYROXINE SODIUM 50 UG/1
50 TABLET ORAL DAILY
Qty: 90 TABLET | Refills: 1 | Status: SHIPPED | OUTPATIENT
Start: 2025-01-22

## 2025-01-22 NOTE — TELEPHONE ENCOUNTER
Reason for call:   [x] Refill   [] Prior Auth  [] Other:     Office:   [x] PCP/Provider -   [] Specialty/Provider -     Medication: Levothyroxine     Dose/Frequency: 50 mcg tablet taken by mouth once every day     Quantity: 90    Pharmacy: [id number] - [place] - [address] [facility] [phone]     Does the patient have enough for 3 days?   [] Yes   [x] No - Send as HP to POD

## 2025-02-13 ENCOUNTER — PROCEDURE VISIT (OUTPATIENT)
Dept: OBGYN CLINIC | Facility: CLINIC | Age: 55
End: 2025-02-13
Payer: COMMERCIAL

## 2025-02-13 VITALS — DIASTOLIC BLOOD PRESSURE: 74 MMHG | SYSTOLIC BLOOD PRESSURE: 130 MMHG | WEIGHT: 181.8 LBS | BODY MASS INDEX: 31.04 KG/M2

## 2025-02-13 DIAGNOSIS — N93.9 ABNORMAL UTERINE BLEEDING: Primary | ICD-10-CM

## 2025-02-13 PROCEDURE — 88305 TISSUE EXAM BY PATHOLOGIST: CPT | Performed by: PATHOLOGY

## 2025-02-13 PROCEDURE — 58100 BIOPSY OF UTERUS LINING: CPT | Performed by: STUDENT IN AN ORGANIZED HEALTH CARE EDUCATION/TRAINING PROGRAM

## 2025-02-13 NOTE — PROGRESS NOTES
Endometrial biopsy    Date/Time: 2/13/2025 2:00 PM    Performed by: Sharda Berry DO  Authorized by: Sharda Berry DO  Universal Protocol:  procedure performed by consultantConsent: Verbal consent obtained.  Risks and benefits: risks, benefits and alternatives were discussed  Consent given by: patient  Patient understanding: patient states understanding of the procedure being performed  Relevant documents: relevant documents present and verified  Test results: test results available and properly labeled  Radiology Images displayed and confirmed. If images not available, report reviewed: imaging studies available  Required items: required blood products, implants, devices, and special equipment available  Patient identity confirmed: verbally with patient    Indication:     Indications: Other disorder of menstruation and other abnormal bleeding from female genital tract    Procedure:     Procedure: endometrial biopsy with Pipelle      A bivalve speculum was placed in the vagina: yes      Cervix cleaned and prepped: yes      The cervix was dilated: no      Uterus sound depth (cm):  8    Curettes used:  2    Specimen collected: insufficient sample size      Patient tolerated procedure well with no complications: yes    Findings:     Uterus size:  Non-gravid    Cervix: normal      Adnexa: normal    Comments:     Procedure comments:  Bleeding and infectious precautions

## 2025-02-18 ENCOUNTER — RESULTS FOLLOW-UP (OUTPATIENT)
Dept: OBGYN CLINIC | Facility: CLINIC | Age: 55
End: 2025-02-18

## 2025-03-10 ENCOUNTER — APPOINTMENT (OUTPATIENT)
Dept: LAB | Facility: CLINIC | Age: 55
End: 2025-03-10
Payer: COMMERCIAL

## 2025-03-10 DIAGNOSIS — E78.5 DYSLIPIDEMIA: ICD-10-CM

## 2025-03-10 DIAGNOSIS — G47.33 OSA (OBSTRUCTIVE SLEEP APNEA): ICD-10-CM

## 2025-03-10 DIAGNOSIS — E78.2 MIXED HYPERLIPIDEMIA: ICD-10-CM

## 2025-03-10 DIAGNOSIS — E03.9 HYPOTHYROIDISM (ACQUIRED): ICD-10-CM

## 2025-03-10 DIAGNOSIS — Z12.31 ENCOUNTER FOR SCREENING MAMMOGRAM FOR BREAST CANCER: ICD-10-CM

## 2025-03-10 LAB
ALBUMIN SERPL BCG-MCNC: 4.5 G/DL (ref 3.5–5)
ALP SERPL-CCNC: 53 U/L (ref 34–104)
ALT SERPL W P-5'-P-CCNC: 18 U/L (ref 7–52)
ANION GAP SERPL CALCULATED.3IONS-SCNC: 6 MMOL/L (ref 4–13)
AST SERPL W P-5'-P-CCNC: 19 U/L (ref 13–39)
BILIRUB SERPL-MCNC: 0.36 MG/DL (ref 0.2–1)
BUN SERPL-MCNC: 16 MG/DL (ref 5–25)
CALCIUM SERPL-MCNC: 9.6 MG/DL (ref 8.4–10.2)
CHLORIDE SERPL-SCNC: 101 MMOL/L (ref 96–108)
CHOLEST SERPL-MCNC: 174 MG/DL (ref ?–200)
CO2 SERPL-SCNC: 30 MMOL/L (ref 21–32)
CREAT SERPL-MCNC: 0.98 MG/DL (ref 0.6–1.3)
GFR SERPL CREATININE-BSD FRML MDRD: 64 ML/MIN/1.73SQ M
GLUCOSE P FAST SERPL-MCNC: 110 MG/DL (ref 65–99)
HDLC SERPL-MCNC: 41 MG/DL
LDLC SERPL CALC-MCNC: 100 MG/DL (ref 0–100)
NONHDLC SERPL-MCNC: 133 MG/DL
POTASSIUM SERPL-SCNC: 4 MMOL/L (ref 3.5–5.3)
PROT SERPL-MCNC: 7.3 G/DL (ref 6.4–8.4)
SODIUM SERPL-SCNC: 137 MMOL/L (ref 135–147)
TRIGL SERPL-MCNC: 164 MG/DL (ref ?–150)

## 2025-03-10 PROCEDURE — 80053 COMPREHEN METABOLIC PANEL: CPT

## 2025-03-10 PROCEDURE — 36415 COLL VENOUS BLD VENIPUNCTURE: CPT

## 2025-03-10 PROCEDURE — 80061 LIPID PANEL: CPT

## 2025-03-11 ENCOUNTER — OFFICE VISIT (OUTPATIENT)
Age: 55
End: 2025-03-11
Payer: COMMERCIAL

## 2025-03-11 VITALS
HEART RATE: 81 BPM | HEIGHT: 64 IN | OXYGEN SATURATION: 99 % | BODY MASS INDEX: 30.39 KG/M2 | TEMPERATURE: 96.1 F | DIASTOLIC BLOOD PRESSURE: 80 MMHG | SYSTOLIC BLOOD PRESSURE: 130 MMHG | RESPIRATION RATE: 16 BRPM | WEIGHT: 178 LBS

## 2025-03-11 DIAGNOSIS — E78.2 MIXED HYPERLIPIDEMIA: ICD-10-CM

## 2025-03-11 DIAGNOSIS — E03.9 HYPOTHYROIDISM (ACQUIRED): ICD-10-CM

## 2025-03-11 DIAGNOSIS — G47.33 OSA (OBSTRUCTIVE SLEEP APNEA): ICD-10-CM

## 2025-03-11 DIAGNOSIS — Z23 ENCOUNTER FOR IMMUNIZATION: ICD-10-CM

## 2025-03-11 DIAGNOSIS — Z00.00 ANNUAL PHYSICAL EXAM: Primary | ICD-10-CM

## 2025-03-11 PROCEDURE — 99396 PREV VISIT EST AGE 40-64: CPT | Performed by: INTERNAL MEDICINE

## 2025-03-11 PROCEDURE — 90750 HZV VACC RECOMBINANT IM: CPT | Performed by: INTERNAL MEDICINE

## 2025-03-11 PROCEDURE — 90471 IMMUNIZATION ADMIN: CPT | Performed by: INTERNAL MEDICINE

## 2025-03-11 NOTE — PATIENT INSTRUCTIONS
"Patient Education     Routine physical for adults   The Basics   Written by the doctors and editors at [facility]   What is a physical? -- A physical is a routine visit, or \"check-up,\" with your doctor. You might also hear it called a \"wellness visit\" or \"preventive visit.\"  During each visit, the doctor will:   Ask about your physical and mental health   Ask about your habits, behaviors, and lifestyle   Do an exam   Give you vaccines if needed   Talk to you about any medicines you take   Give advice about your health   Answer your questions  Getting regular check-ups is an important part of taking care of your health. It can help your doctor find and treat any problems you have. But it's also important for preventing health problems.  A routine physical is different from a \"sick visit.\" A sick visit is when you see a doctor because of a health concern or problem. Since physicals are scheduled ahead of time, you can think about what you want to ask the doctor.  How often should I get a physical? -- It depends on your age and health. In general, for people age 21 years and older:   If you are younger than 50 years, you might be able to get a physical every 3 years.   If you are 50 years or older, your doctor might recommend a physical every year.  If you have an ongoing health condition, like diabetes or high blood pressure, your doctor will probably want to see you more often.  What happens during a physical? -- In general, each visit will include:   Physical exam - The doctor or nurse will check your height, weight, heart rate, and blood pressure. They will also look at your eyes and ears. They will ask about how you are feeling and whether you have any symptoms that bother you.   Medicines - It's a good idea to bring a list of all the medicines you take to each doctor visit. Your doctor will talk to you about your medicines and answer any questions. Tell them if you are having any side effects that bother you. You " "should also tell them if you are having trouble paying for any of your medicines.   Habits and behaviors - This includes:   Your diet   Your exercise habits   Whether you smoke, drink alcohol, or use drugs   Whether you are sexually active   Whether you feel safe at home  Your doctor will talk to you about things you can do to improve your health and lower your risk of health problems. They will also offer help and support. For example, if you want to quit smoking, they can give you advice and might prescribe medicines. If you want to improve your diet or get more physical activity, they can help you with this, too.   Lab tests, if needed - The tests you get will depend on your age and situation. For example, your doctor might want to check your:   Cholesterol   Blood sugar   Iron level   Vaccines - The recommended vaccines will depend on your age, health, and what vaccines you already had. Vaccines are very important because they can prevent certain serious or deadly infections.   Discussion of screening - \"Screening\" means checking for diseases or other health problems before they cause symptoms. Your doctor can recommend screening based on your age, risk, and preferences. This might include tests to check for:   Cancer, such as breast, prostate, cervical, ovarian, colorectal, prostate, lung, or skin cancer   Sexually transmitted infections, such as chlamydia and gonorrhea   Mental health conditions like depression and anxiety  Your doctor will talk to you about the different types of screening tests. They can help you decide which screenings to have. They can also explain what the results might mean.   Answering questions - The physical is a good time to ask the doctor or nurse questions about your health. If needed, they can refer you to other doctors or specialists, too.  Adults older than 65 years often need other care, too. As you get older, your doctor will talk to you about:   How to prevent falling at " home   Hearing or vision tests   Memory testing   How to take your medicines safely   Making sure that you have the help and support you need at home  All topics are updated as new evidence becomes available and our peer review process is complete.  This topic retrieved from [employer] on: May 02, 2024.  Topic 810632 Version 1.0  Release: 32.4.3 - C32.122  © 2024 UpToDate, Inc. and/or its affiliates. All rights reserved.  Consumer Information Use and Disclaimer   Disclaimer: This generalized information is a limited summary of diagnosis, treatment, and/or medication information. It is not meant to be comprehensive and should be used as a tool to help the user understand and/or assess potential diagnostic and treatment options. It does NOT include all information about conditions, treatments, medications, side effects, or risks that may apply to a specific patient. It is not intended to be medical advice or a substitute for the medical advice, diagnosis, or treatment of a health care provider based on the health care provider's examination and assessment of a patient's specific and unique circumstances. Patients must speak with a health care provider for complete information about their health, medical questions, and treatment options, including any risks or benefits regarding use of medications. This information does not endorse any treatments or medications as safe, effective, or approved for treating a specific patient. UpToDate, Inc. and its affiliates disclaim any warranty or liability relating to this information or the use thereof.The use of this information is governed by the Terms of Use, available at https://www.[employer].com/en/know/clinical-effectiveness-terms. 2024© UpToDate, Inc. and its affiliates and/or licensors. All rights reserved.  Copyright   © 2024 UpToDate, Inc. and/or its affiliates. All rights reserved.

## 2025-03-11 NOTE — PROGRESS NOTES
Adult Annual Physical  Name: Alejandrina Charles      : 1970      MRN: [id number]  Encounter Provider: Tigre Red MD  Encounter Date: 3/11/2025   Encounter department: [facility]    Assessment & Plan  MAXINE (obstructive sleep apnea)    Orders:    CBC and differential; Future    Comprehensive metabolic panel; Future    TSH, 3rd generation with Free T4 reflex; Future    Hypothyroidism (acquired)    Orders:    CBC and differential; Future    Comprehensive metabolic panel; Future    TSH, 3rd generation with Free T4 reflex; Future    Mixed hyperlipidemia    Orders:    CBC and differential; Future    Comprehensive metabolic panel; Future    Lipid panel; Future    TSH, 3rd generation with Free T4 reflex; Future    Annual physical exam    Orders:    CBC and differential; Future    Comprehensive metabolic panel; Future    TSH, 3rd generation with Free T4 reflex; Future    BMI 30.0-30.9,adult    Orders:    CBC and differential; Future    Comprehensive metabolic panel; Future    TSH, 3rd generation with Free T4 reflex; Future    Preventive Screenings:  - Diabetes Screening: screening up-to-date  - Cholesterol Screening: screening not indicated and has hyperlipidemia   - Hepatitis C screening: screening up-to-date   - HIV screening: screening up-to-date   - Cervical cancer screening: screening up-to-date   - Breast cancer screening: screening up-to-date   - Colon cancer screening: screening up-to-date   - Lung cancer screening: screening not indicated     Immunizations:  - Immunizations due: Influenza and Zoster (Shingrix)    Counseling/Anticipatory Guidance:    - Diet: discussed recommendations for a healthy/well-balanced diet.   - Exercise: the importance of regular exercise/physical activity was discussed. Recommend exercise 3-5 times per week for at least 30 minutes.   - Injury prevention: discussed safety/seat belts, safety helmets, smoke detectors, carbon monoxide detectors, and  smoking near bedding or upholstery.       Depression Screening and Follow-up Plan: Patient was screened for depression during today's encounter. They screened negative with a PHQ-2 score of 0.          History of Present Illness     Adult Annual Physical:  Patient presents for annual physical.     Diet and Physical Activity:  - Diet/Nutrition: consuming 3-5 servings of fruits/vegetables daily, adequate fiber intake, portion control, well balanced diet, limited junk food, heart healthy (low sodium) diet, low fat diet and adequate whole grain intake.  - Exercise: no formal exercise, less than 30 minutes on average, walking and moderate cardiovascular exercise. walks on treadmill at home    Depression Screening:  - PHQ-2 Score: 0    General Health:  - Sleep: sleeps well, 4-6 hours of sleep on average, witnessed apnea, witnessed gasping, snores loudly and daytime hypersomnolence. has the insipre it is working great  - Hearing: normal hearing bilateral ears.  - Vision: wears glasses and contacts, no vision problems and most recent eye exam < 1 year ago.  - Dental: regular dental visits, no dental visits for > 1 year, brushes teeth once daily, brushes teeth twice daily and floss regularly.    /GYN Health:  - Follows with GYN: yes.   - Menopause: postmenopausal.   - History of STDs: no  - Contraception: menopause.      Advanced Care Planning:  - Has an advanced directive?: no    - Has a durable medical POA?: no    - ACP document given to patient?: yes      Review of Systems   Constitutional:  Negative for appetite change, chills, diaphoresis, fatigue, fever and unexpected weight change.   Respiratory:  Negative for apnea, cough, choking, chest tightness, shortness of breath, wheezing and stridor.    Cardiovascular:  Negative for chest pain, palpitations and leg swelling.   Gastrointestinal:  Negative for abdominal distention, abdominal pain, anal bleeding, blood in stool, constipation, diarrhea, nausea and vomiting.    Genitourinary:  Negative for decreased urine volume, difficulty urinating, frequency and urgency.   Musculoskeletal:  Negative for arthralgias, back pain and myalgias.   Neurological:  Negative for dizziness, light-headedness, numbness and headaches.     Medical History Reviewed by provider this encounter:  Tobacco  Allergies  Meds  Problems  Med Hx  Surg Hx  Fam Hx     .  Past Medical History   Past Medical History:   Diagnosis Date    Allergic rhinitis     CPAP (continuous positive airway pressure) dependence     Disease of thyroid gland 2003    Hyperlipidemia     diet controlled, on no meds currently    Hypothyroidism     Sleep apnea     CPAP     Past Surgical History:   Procedure Laterality Date    BREAST BIOPSY Right     Benign    COLONOSCOPY      EGD      KNEE SURGERY      MAMMO (HISTORICAL)  11/02/2017    10/31/2016, 10/29/2015    [place] OPEN IMPLTJ HPGLSL NRV NSTIM RA PG&RESPIR SENSOR N/A 4/18/2024    Procedure: INSERTION UPPER AIRWAY STIMULATOR;  Surgeon: Pro Salazar MD;  Location: [facility];  Service: ENT     Family History   Problem Relation Age of Onset    Arthritis Mother     Diabetes Father     No Known Problems Sister     No Known Problems Sister     No Known Problems Sister     No Known Problems Sister     No Known Problems Daughter     No Known Problems Maternal Grandmother     No Known Problems Maternal Grandfather     No Known Problems Paternal Grandmother     Cancer Paternal Grandfather     No Known Problems Maternal Aunt     No Known Problems Maternal Aunt     No Known Problems Paternal Aunt     Breast cancer Neg Hx       reports that she has never smoked. She has never been exposed to tobacco smoke. She has never used smokeless tobacco. She reports current alcohol use. She reports that she does not use drugs.  Current Outpatient Medications   Medication Instructions    B Complex Vitamins (B COMPLEX PO) Take by mouth    Calcium Carbonate-Vitamin D (CALCIUM-D PO) Take by mouth     "Cholecalciferol (VITAMIN D-3 PO) Take by mouth    fluticasone (FLONASE) 50 mcg/act nasal spray As needed    levothyroxine 50 mcg, Oral, Daily    loratadine (CLARITIN) 10 mg, Daily    Multiple Vitamin (MULTI-VITAMIN PO) Take by mouth   No Known Allergies   Current Outpatient Medications on File Prior to Visit   Medication Sig Dispense Refill    B Complex Vitamins (B COMPLEX PO) Take by mouth      Calcium Carbonate-Vitamin D (CALCIUM-D PO) Take by mouth      Cholecalciferol (VITAMIN D-3 PO) Take by mouth      fluticasone (FLONASE) 50 mcg/act nasal spray if needed      levothyroxine 50 mcg tablet Take 1 tablet (50 mcg total) by mouth daily 90 tablet 1    loratadine (CLARITIN) 10 mg tablet Take 10 mg by mouth Daily      Multiple Vitamin (MULTI-VITAMIN PO) Take by mouth       No current facility-administered medications on file prior to visit.      Social History     Tobacco Use    Smoking status: Never     Passive exposure: Never    Smokeless tobacco: Never   Vaping Use    Vaping status: Never Used   Substance and Sexual Activity    Alcohol use: Yes     Comment: Social - not even weekly    Drug use: No    Sexual activity: Not Currently     Partners: Male       Objective   /80 (Patient Position: Sitting, Cuff Size: Standard)   Pulse 81   Temp (!) 96.1 °F (35.6 °C) (Temporal)   Resp 16   Ht 5' 4.17\" (1.63 m)   Wt 80.7 kg (178 lb)   SpO2 99%   BMI 30.39 kg/m²     Physical Exam  Constitutional:       General: She is not in acute distress.     Appearance: Normal appearance. She is normal weight. She is not ill-appearing, toxic-appearing or diaphoretic.   Cardiovascular:      Rate and Rhythm: Normal rate and regular rhythm.      Pulses: Normal pulses.      Heart sounds: Normal heart sounds. No murmur heard.     No gallop.   Pulmonary:      Effort: Pulmonary effort is normal. No respiratory distress.      Breath sounds: Normal breath sounds. No stridor. No wheezing, rhonchi or rales.   Chest:      Chest wall: No " tenderness.   Abdominal:      General: There is no distension.      Palpations: Abdomen is soft.      Tenderness: There is no abdominal tenderness. There is no guarding.   Musculoskeletal:      Right lower leg: No edema.      Left lower leg: No edema.   Neurological:      Mental Status: She is alert and oriented to person, place, and time.

## 2025-06-11 ENCOUNTER — CLINICAL SUPPORT (OUTPATIENT)
Age: 55
End: 2025-06-11
Payer: COMMERCIAL

## 2025-06-11 DIAGNOSIS — Z23 ENCOUNTER FOR IMMUNIZATION: Primary | ICD-10-CM

## 2025-06-11 PROCEDURE — 90471 IMMUNIZATION ADMIN: CPT | Performed by: INTERNAL MEDICINE

## 2025-06-11 PROCEDURE — 90750 HZV VACC RECOMBINANT IM: CPT | Performed by: INTERNAL MEDICINE

## 2025-06-23 ENCOUNTER — HOSPITAL ENCOUNTER (OUTPATIENT)
Facility: HOSPITAL | Age: 55
Discharge: HOME | End: 2025-06-23
Payer: COMMERCIAL

## 2025-06-23 DIAGNOSIS — Z12.31 ENCOUNTER FOR SCREENING MAMMOGRAM FOR BREAST CANCER: ICD-10-CM

## 2025-06-23 PROCEDURE — 77063 BREAST TOMOSYNTHESIS BI: CPT

## 2025-06-23 PROCEDURE — 77067 SCR MAMMO BI INCL CAD: CPT

## 2025-07-19 DIAGNOSIS — E03.9 HYPOTHYROIDISM (ACQUIRED): ICD-10-CM

## 2025-07-20 RX ORDER — LEVOTHYROXINE SODIUM 50 UG/1
50 TABLET ORAL DAILY
Qty: 90 TABLET | Refills: 1 | Status: SHIPPED | OUTPATIENT
Start: 2025-07-20

## (undated) DEVICE — TIBURON SPLIT SHEET: Brand: CONVERTORS

## (undated) DEVICE — GLOVE SRG BIOGEL 7.5

## (undated) DEVICE — CATHETER 8591-38 PASSER,38CM

## (undated) DEVICE — PACK UNIVERSAL NECK

## (undated) DEVICE — SYRINGE 10ML LL

## (undated) DEVICE — DECANTER: Brand: UNBRANDED

## (undated) DEVICE — 3M™ TEGADERM™ TRANSPARENT FILM DRESSING FRAME STYLE, 1624W, 2-3/8 IN X 2-3/4 IN (6 CM X 7 CM), 100/CT 4CT/CASE: Brand: 3M™ TEGADERM™

## (undated) DEVICE — GLOVE INDICATOR PI UNDERGLOVE SZ 7.5 BLUE

## (undated) DEVICE — 3M™ STERI-STRIP™ COMPOUND BENZOIN TINCTURE 40 BAGS/CARTON 4 CARTONS/CASE C1544: Brand: 3M™ STERI-STRIP™

## (undated) DEVICE — VESSEL LOOPS X-RAY DETECTABLE: Brand: DEROYAL

## (undated) DEVICE — 10FR FRAZIER SUCTION HANDLE: Brand: CARDINAL HEALTH

## (undated) DEVICE — INTENDED FOR TISSUE SEPARATION, AND OTHER PROCEDURES THAT REQUIRE A SHARP SURGICAL BLADE TO PUNCTURE OR CUT.: Brand: BARD-PARKER SAFETY BLADES SIZE 15, STERILE

## (undated) DEVICE — ELECTRODE 8227304 5PK PRASS PR 18MM ROHS

## (undated) DEVICE — 3M™ IOBAN™ 2 ANTIMICROBIAL INCISE DRAPE 6650EZ: Brand: IOBAN™ 2

## (undated) DEVICE — GAUZE SPONGES,USP TYPE VII GAUZE, 12 PLY: Brand: CURITY

## (undated) DEVICE — WET SKIN PREP TRAY: Brand: MEDLINE INDUSTRIES, INC.

## (undated) DEVICE — SUT SILK 2-0 SH 30 IN K833H

## (undated) DEVICE — PROBE 8225401 5PK SD-SD BIPOL STIM ROHS

## (undated) DEVICE — REMOTE SLEEP INSPIRE

## (undated) DEVICE — BULB SYRINGE,IRRIGATION WITH PROTECTIVE CAP: Brand: DOVER

## (undated) DEVICE — IV CATH 18 G X 1.16 IN

## (undated) DEVICE — KERLIX BANDAGE ROLL: Brand: KERLIX

## (undated) DEVICE — ELECTRODE BLADE MOD E-Z CLEAN  2.75IN 7CM -0012AM

## (undated) DEVICE — DRESSING MEPORE FILM ADHESIVE 4 X 5IN

## (undated) DEVICE — 3M™ STERI-STRIP™ REINFORCED ADHESIVE SKIN CLOSURES, R1547, 1/2 IN X 4 IN (12 MM X 100 MM), 6 STRIPS/ENVELOPE: Brand: 3M™ STERI-STRIP™

## (undated) DEVICE — PROVE COVER: Brand: UNBRANDED

## (undated) DEVICE — SUT VICRYL 3-0 SH 27 IN J416H

## (undated) DEVICE — SUT SILK PERMA-HAND 3-0 18IN A182H

## (undated) DEVICE — SUT MONOCRYL 4-0 PS-2 27 IN Y426H

## (undated) DEVICE — DISPOSABLE EQUIPMENT COVER: Brand: SMALL TOWEL DRAPE

## (undated) DEVICE — SKIN MARKER DUAL TIP WITH RULER CAP, FLEXIBLE RULER AND LABELS: Brand: DEVON

## (undated) DEVICE — BIPOLAR CORD DISP

## (undated) DEVICE — NEEDLE 18 G X 1 1/2 SAFETY

## (undated) DEVICE — NEEDLE 25G X 1 1/2

## (undated) DEVICE — SUT SILK 3-0 RB-1 CV-23 18IN C053D